# Patient Record
Sex: MALE | Race: WHITE | Employment: FULL TIME | URBAN - METROPOLITAN AREA
[De-identification: names, ages, dates, MRNs, and addresses within clinical notes are randomized per-mention and may not be internally consistent; named-entity substitution may affect disease eponyms.]

---

## 2017-01-25 ENCOUNTER — HOSPITAL ENCOUNTER (EMERGENCY)
Facility: CLINIC | Age: 32
Discharge: HOME | End: 2017-01-25
Attending: EMERGENCY MEDICINE | Admitting: EMERGENCY MEDICINE
Payer: COMMERCIAL

## 2017-01-25 VITALS
SYSTOLIC BLOOD PRESSURE: 135 MMHG | DIASTOLIC BLOOD PRESSURE: 80 MMHG | WEIGHT: 190 LBS | RESPIRATION RATE: 20 BRPM | BODY MASS INDEX: 27.2 KG/M2 | OXYGEN SATURATION: 98 % | TEMPERATURE: 98.2 F | HEIGHT: 70 IN

## 2017-01-25 DIAGNOSIS — M54.2 CERVICALGIA: ICD-10-CM

## 2017-01-25 DIAGNOSIS — M54.12 CERVICAL RADICULOPATHY: ICD-10-CM

## 2017-01-25 PROCEDURE — 25000132 ZZH RX MED GY IP 250 OP 250 PS 637: Performed by: EMERGENCY MEDICINE

## 2017-01-25 PROCEDURE — 25000131 ZZH RX MED GY IP 250 OP 636 PS 637: Performed by: EMERGENCY MEDICINE

## 2017-01-25 PROCEDURE — 99283 EMERGENCY DEPT VISIT LOW MDM: CPT

## 2017-01-25 RX ORDER — IBUPROFEN 600 MG/1
600 TABLET, FILM COATED ORAL ONCE
Status: COMPLETED | OUTPATIENT
Start: 2017-01-25 | End: 2017-01-25

## 2017-01-25 RX ORDER — METHYLPREDNISOLONE 4 MG
TABLET, DOSE PACK ORAL
Qty: 21 TABLET | Refills: 0 | Status: SHIPPED | OUTPATIENT
Start: 2017-01-25 | End: 2017-02-14

## 2017-01-25 RX ORDER — CYCLOBENZAPRINE HCL 10 MG
10 TABLET ORAL 3 TIMES DAILY PRN
Qty: 12 TABLET | Refills: 0 | Status: SHIPPED | OUTPATIENT
Start: 2017-01-25 | End: 2017-01-31

## 2017-01-25 RX ORDER — GINSENG 100 MG
CAPSULE ORAL
Status: DISCONTINUED
Start: 2017-01-25 | End: 2017-01-26 | Stop reason: HOSPADM

## 2017-01-25 RX ORDER — DEXAMETHASONE 4 MG/1
8 TABLET ORAL ONCE
Status: COMPLETED | OUTPATIENT
Start: 2017-01-25 | End: 2017-01-25

## 2017-01-25 RX ORDER — IBUPROFEN 800 MG/1
800 TABLET, FILM COATED ORAL EVERY 8 HOURS PRN
Qty: 24 TABLET | Refills: 0 | Status: SHIPPED | OUTPATIENT
Start: 2017-01-25 | End: 2017-02-02

## 2017-01-25 RX ADMIN — IBUPROFEN 600 MG: 600 TABLET ORAL at 20:05

## 2017-01-25 RX ADMIN — DEXAMETHASONE 8 MG: 4 TABLET ORAL at 23:00

## 2017-01-25 ASSESSMENT — ENCOUNTER SYMPTOMS
FEVER: 0
WEAKNESS: 0
HEADACHES: 1
NECK PAIN: 1
BACK PAIN: 1
NUMBNESS: 0

## 2017-01-25 NOTE — ED AVS SNAPSHOT
[facility] Emergency Department    [address]    [facility] [address]    Phone:  [phone]    Fax:  [phone]                                       Eladio Navarro   MRN: [id number]    Department:  [facility] Emergency Department   Date of Visit:  1/25/2017           After Visit Summary Signature Page     I have received my discharge instructions, and my questions have been answered. I have discussed any challenges I see with this plan with the nurse or doctor.    ..........................................................................................................................................  Patient/Patient Representative Signature      ..........................................................................................................................................  Patient Representative Print Name and Relationship to Patient    ..................................................               ................................................  Date                                            Time    ..........................................................................................................................................  Reviewed by Signature/Title    ...................................................              ..............................................  Date                                                            Time

## 2017-01-25 NOTE — ED AVS SNAPSHOT
[facility] Emergency Department    [address]    [facility] [address]    Phone:  [phone]    Fax:  [phone]                                       Eladio Navarro   MRN: [id number]    Department:  [facility] Emergency Department   Date of Visit:  1/25/2017           Patient Information     Date Of Birth          1985        Your diagnoses for this visit were:     Cervicalgia     Cervical radiculopathy        You were seen by Oniel Hensley MD.      Follow-up Information     Follow up with Spine clinic as discussed.        Discharge Instructions           Chronic Pain Resources  *Many of the listed clinics will need a physician referral and medical records sent prior to making an appointment. Insuranceproviders often need to be called for acceptance.  Clinic Location/Contact Information/Hours Information   [facility]  [address] [place] [address]  [phone] Sub acute and chronic pain - interventional pain medicine available   [facility] [place] Tw[address]  [address] [place] [address] [phone]  M-TH: 7:30 am - 5:00 pm F: 7:30 am - 4:00 pm Sub acute and chronic pain - interventional pain medicine available   [facility] Medical Pain Specialist   Dr. Benny Dc [address] [facility],  [address] [place] [address] [phone]  M-F: 8:00 am - 5:30 pm Acute and chronic pain management including medication management.   [facility]  Chronic Pain    Dr. Wayne Morales [address] [place] [address] [phone]  M-F: 7:00 am - 4:30 pm Inpatient residential chronic pain program and outpatient clinic, focus on rehabilitative pain management.   [facility]  Interventional Pain Clinic [address] [place] [address]  [address] [facility], [address] [place] [address] [phone]  M-F: 8:00 am - 4:00 pm Specializing in  interventional pain management including epidural injections and medication management.   [facility] [address] [place] [address]  [phone]  M-TH: 8:00 am - 9:00 pm  F: 8:00 am - 4:30 pm Integrative medicine services including massage therapy, reflexology, mind/body therapy, guided imagery, acupuncture, exercises physiology, healing , and integrative nutrition.   [facility]  [address] [facility], [address]  [place] [address]  *Other [facility] Locations Available  [phone]  M-F: Depends on clinic Chronic, cancer and spinal pain syndromes - focus on interventions, rehabilitation, alternative medicine and behavioral health. Offers an outpatient four week chronic pain program, four hours daily, Monday through Friday.   [facility]  [address], [address] [place] [address]  [phone]  M-F: 8:00 am - 5:00 pm   Specializing in neck and back pain, headache management, TMJ/Orofacial pain, including psychological and alternative therapies  New Outpatient Programs: Fibromyalgia, Chronic Daily Headache, Chronic Neck & Back Pain, Chronic Neck & Shoulder Pain in Dental Professionals             Discharge References/Attachments     RADICULOPATHY, CERVICAL (ENGLISH)    DEGENERATIVE DISK DISEASE (ENGLISH)      24 Hour Appointment Hotline       To make an appointment at any [facility] call [phone] ([phone]). If you don't have a family doctor or clinic, we will help you find one. [facility] are conveniently located to serve the needs of you and your family.             Review of your medicines      START taking        Dose / Directions Last dose taken    cyclobenzaprine 10 MG tablet   Commonly known as:  FLEXERIL   Dose:  10 mg   Quantity:  12 tablet        Take 1 tablet (10 mg) by mouth 3 times daily as needed for  muscle spasms   Refills:  0        methylPREDNISolone 4 MG tablet   Commonly known as:  MEDROL DOSEPAK   Quantity:  21 tablet        Follow package instructions   Refills:  0          CONTINUE these medicines which may have CHANGED, or have new prescriptions. If we are uncertain of the size of tablets/capsules you have at home, strength may be listed as something that might have changed.        Dose / Directions Last dose taken    ibuprofen 800 MG tablet   Commonly known as:  ADVIL/MOTRIN   Dose:  800 mg   What changed:    - medication strength  - how much to take  - when to take this   Quantity:  24 tablet        Take 1 tablet (800 mg) by mouth every 8 hours as needed for moderate pain   Refills:  0          Our records show that you are taking the medicines listed below. If these are incorrect, please call your family doctor or clinic.        Dose / Directions Last dose taken    ALEVE PO        Refills:  0        diclofenac 0.1 % ophthalmic solution   Commonly known as:  VOLTAREN   Quantity:  10 mL        Use one drop in both eyes four times a day for 2 days and then as directed by ophthamology   Refills:  0        * methocarbamol 750 MG tablet   Commonly known as:  ROBAXIN   Dose:  750 mg   Quantity:  30 tablet        Take 1 tablet (750 mg) by mouth 4 times daily as needed for muscle spasms   Refills:  0        * methocarbamol 750 MG tablet   Commonly known as:  ROBAXIN   Dose:  750 mg   Quantity:  30 tablet        Take 1 tablet (750 mg) by mouth 3 times daily as needed for muscle spasms   Refills:  0        OXYCODONE HCL PO        Refills:  0        * Notice:  This list has 2 medication(s) that are the same as other medications prescribed for you. Read the directions carefully, and ask your doctor or other care provider to review them with you.            Prescriptions were sent or printed at these locations (3 Prescriptions)                   Other Prescriptions                Printed at Department/Unit printer  (3 of 3)         methylPREDNISolone (MEDROL DOSEPAK) 4 MG tablet               ibuprofen (ADVIL/MOTRIN) 800 MG tablet               cyclobenzaprine (FLEXERIL) 10 MG tablet                Orders Needing Specimen Collection     None      Pending Results     No orders found from 1/24/2017 to 1/26/2017.            Pending Culture Results     No orders found from 1/24/2017 to 1/26/2017.             Test Results from your hospital stay            Clinical Quality Measure: Blood Pressure Screening     Your blood pressure was checked while you were in the emergency department today. The last reading we obtained was  BP: 115/89 mmHg . Please read the guidelines below about what these numbers mean and what you should do about them.  If your systolic blood pressure (the top number) is less than 120 and your diastolic blood pressure (the bottom number) is less than 80, then your blood pressure is normal. There is nothing more that you need to do about it.  If your systolic blood pressure (the top number) is 120-139 or your diastolic blood pressure (the bottom number) is 80-89, your blood pressure may be higher than it should be. You should have your blood pressure rechecked within a year by a primary care provider.  If your systolic blood pressure (the top number) is 140 or greater or your diastolic blood pressure (the bottom number) is 90 or greater, you may have high blood pressure. High blood pressure is treatable, but if left untreated over time it can put you at risk for heart attack, stroke, or kidney failure. You should have your blood pressure rechecked by a primary care provider within the next 4 weeks.  If your provider in the emergency department today gave you specific instructions to follow-up with your doctor or provider even sooner than that, you should follow that instruction and not wait for up to 4 weeks for your follow-up visit.        Thank you for choosing [facility]       Thank you for choosing [facility] for  "your care. Our goal is always to provide you with excellent care. Hearing back from our patients is one way we can continue to improve our services. Please take a few minutes to complete the written survey that you may receive in the mail after you visit with us. Thank you!        [email or web address] Information     [employer] Information lets you send messages to your doctor, view your test results, renew your prescriptions, schedule appointments and more. To sign up, go to [email or web address] . Click on \"Log in\" on the left side of the screen, which will take you to the Welcome page. Then click on \"Sign up Now\" on the right side of the page.     You will be asked to enter the access code listed below, as well as some personal information. Please follow the directions to create your username and password.     Your access code is: [email or web address]  Expires: 3/9/2017 11:40 PM     Your access code will  in 90 days. If you need help or a new code, please call your [facility] clinic or [phone].        Care EveryWhere ID     This is your Care EveryWhere ID. This could be used by other organizations to access your [facility] medical records  [id number]        After Visit Summary       This is your record. Keep this with you and show to your community pharmacist(s) and doctor(s) at your next visit.                  "
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
## 2017-01-26 NOTE — DISCHARGE INSTRUCTIONS
Chronic Pain Resources  *Many of the listed clinics will need a physician referral and medical records sent prior to making an appointment. Insuranceproviders often need to be called for acceptance.  Clinic Location/Contact Information/Hours Information   [facility]  [address] [place] [address]  [phone] Sub acute and chronic pain - interventional pain medicine available   [facility] [place] Twelfth [address]  [address] [place], [place] [id number] [phone]  M-TH: 7:30 am - 5:00 pm F: 7:30 am - 4:00 pm Sub acute and chronic pain - interventional pain medicine available   [facility] Medical Pain Specialist   Dr. Benny Dc [address] [facility],  [address] [place], [place] [address] [phone]  M-F: 8:00 am - 5:30 pm Acute and chronic pain management including medication management.   [facility]  Chronic Pain    Dr. Wayne Morales [address] [place], [place] [address] [phone]  M-F: 7:00 am - 4:30 pm Inpatient residential chronic pain program and outpatient clinic, focus on rehabilitative pain management.   [facility]  Interventional Pain Clinic [address] [place] [address], [address] [place], [place] [id number] [phone]  M-F: 8:00 am - 4:00 pm Specializing in interventional pain management including epidural injections and medication management.   [facility] [address] [place], [place] [id number] [phone]  M-TH: 8:00 am - 9:00 pm  F: 8:00 am - 4:30 pm Integrative medicine services including massage therapy, reflexology, mind/body therapy, guided imagery, acupuncture, exercises physiology, healing , and integrative nutrition.   [facility]  [address] [facility], [address]  [place] [address]  *Other [facility] Locations Available  [phone]  M-F: Depends on clinic  Chronic, cancer and spinal pain syndromes - focus on interventions, rehabilitation, alternative medicine and behavioral health. Offers an outpatient four week chronic pain program, four hours daily, Monday through Friday.   [facility] [address], [address] [place] [address]  [phone]  M-F: 8:00 am - 5:00 pm   Specializing in neck and back pain, headache management, TMJ/Orofacial pain, including psychological and alternative therapies  New Outpatient Programs: Fibromyalgia, Chronic Daily Headache, Chronic Neck & Back Pain, Chronic Neck & Shoulder Pain in Dental Professionals

## 2017-01-26 NOTE — ED NOTES
Pt resting in bed; given medication as ordered; reviewed discharge instructions with pt; pt now states he has wound to head from fall; asks nurse for wound care; minor abrasion noted to posterior hairline and top of head; wound care completed with bacitracin; pt instructed on wound care also; denies any other needs

## 2017-01-26 NOTE — ED NOTES
Nurse went to dc pt; pt states he is concerned about his exam; states he does not feel he got a thorough exam and felt dismissed by the doctor; states he is unwilling to go back home and wants to see someone else; nurse explained to pt that the referral to the spine specialist is the best place to treat a spinal issue; pt continues to state he does not want d/c; states pain is worse; charge nurse R C RN asked to check in with pt.

## 2017-02-14 ENCOUNTER — APPOINTMENT (OUTPATIENT)
Dept: GENERAL RADIOLOGY | Facility: CLINIC | Age: 32
End: 2017-02-14
Attending: EMERGENCY MEDICINE
Payer: COMMERCIAL

## 2017-02-14 ENCOUNTER — APPOINTMENT (OUTPATIENT)
Dept: CT IMAGING | Facility: CLINIC | Age: 32
End: 2017-02-14
Attending: EMERGENCY MEDICINE
Payer: COMMERCIAL

## 2017-02-14 ENCOUNTER — HOSPITAL ENCOUNTER (EMERGENCY)
Facility: CLINIC | Age: 32
Discharge: HOME | End: 2017-02-14
Attending: EMERGENCY MEDICINE | Admitting: EMERGENCY MEDICINE
Payer: COMMERCIAL

## 2017-02-14 VITALS
BODY MASS INDEX: 27.2 KG/M2 | DIASTOLIC BLOOD PRESSURE: 68 MMHG | RESPIRATION RATE: 16 BRPM | OXYGEN SATURATION: 96 % | SYSTOLIC BLOOD PRESSURE: 118 MMHG | HEART RATE: 82 BPM | TEMPERATURE: 98.3 F | WEIGHT: 190 LBS | HEIGHT: 70 IN

## 2017-02-14 DIAGNOSIS — S70.02XA CONTUSION OF LEFT HIP, INITIAL ENCOUNTER: ICD-10-CM

## 2017-02-14 DIAGNOSIS — S46.912A LEFT SHOULDER STRAIN, INITIAL ENCOUNTER: ICD-10-CM

## 2017-02-14 DIAGNOSIS — S00.01XA SCALP ABRASION, INITIAL ENCOUNTER: ICD-10-CM

## 2017-02-14 DIAGNOSIS — S16.1XXA CERVICAL STRAIN, ACUTE, INITIAL ENCOUNTER: ICD-10-CM

## 2017-02-14 DIAGNOSIS — S39.012A BACK STRAIN, INITIAL ENCOUNTER: ICD-10-CM

## 2017-02-14 DIAGNOSIS — S09.90XA CLOSED HEAD INJURY, INITIAL ENCOUNTER: ICD-10-CM

## 2017-02-14 DIAGNOSIS — S60.811A ABRASION OF RIGHT WRIST, INITIAL ENCOUNTER: ICD-10-CM

## 2017-02-14 PROCEDURE — 73030 X-RAY EXAM OF SHOULDER: CPT | Mod: LT

## 2017-02-14 PROCEDURE — 72100 X-RAY EXAM L-S SPINE 2/3 VWS: CPT

## 2017-02-14 PROCEDURE — 25000132 ZZH RX MED GY IP 250 OP 250 PS 637: Performed by: EMERGENCY MEDICINE

## 2017-02-14 PROCEDURE — 25000125 ZZHC RX 250: Performed by: EMERGENCY MEDICINE

## 2017-02-14 PROCEDURE — 72072 X-RAY EXAM THORAC SPINE 3VWS: CPT

## 2017-02-14 PROCEDURE — 73502 X-RAY EXAM HIP UNI 2-3 VIEWS: CPT

## 2017-02-14 PROCEDURE — 70450 CT HEAD/BRAIN W/O DYE: CPT

## 2017-02-14 PROCEDURE — 90715 TDAP VACCINE 7 YRS/> IM: CPT | Performed by: EMERGENCY MEDICINE

## 2017-02-14 PROCEDURE — 72125 CT NECK SPINE W/O DYE: CPT

## 2017-02-14 PROCEDURE — 99285 EMERGENCY DEPT VISIT HI MDM: CPT | Mod: 25

## 2017-02-14 PROCEDURE — 90471 IMMUNIZATION ADMIN: CPT

## 2017-02-14 RX ORDER — CYCLOBENZAPRINE HCL 10 MG
10 TABLET ORAL ONCE
Status: COMPLETED | OUTPATIENT
Start: 2017-02-14 | End: 2017-02-14

## 2017-02-14 RX ORDER — GINSENG 100 MG
CAPSULE ORAL
Status: DISCONTINUED
Start: 2017-02-14 | End: 2017-02-15 | Stop reason: HOSPADM

## 2017-02-14 RX ORDER — TRAMADOL HYDROCHLORIDE 50 MG/1
100 TABLET ORAL ONCE
Status: COMPLETED | OUTPATIENT
Start: 2017-02-14 | End: 2017-02-14

## 2017-02-14 RX ORDER — CYCLOBENZAPRINE HCL 10 MG
10 TABLET ORAL 3 TIMES DAILY PRN
Qty: 20 TABLET | Refills: 0 | Status: SHIPPED | OUTPATIENT
Start: 2017-02-14 | End: 2017-02-20

## 2017-02-14 RX ORDER — ACETAMINOPHEN 500 MG
1000 TABLET ORAL ONCE
Status: COMPLETED | OUTPATIENT
Start: 2017-02-14 | End: 2017-02-14

## 2017-02-14 RX ORDER — LIDOCAINE 50 MG/G
1 PATCH TOPICAL EVERY 24 HOURS
Qty: 10 PATCH | Refills: 0 | Status: SHIPPED | OUTPATIENT
Start: 2017-02-14 | End: 2017-02-24

## 2017-02-14 RX ORDER — TRAMADOL HYDROCHLORIDE 50 MG/1
50-100 TABLET ORAL EVERY 6 HOURS PRN
Qty: 15 TABLET | Refills: 0 | Status: SHIPPED | OUTPATIENT
Start: 2017-02-14 | End: 2017-04-26

## 2017-02-14 RX ADMIN — ACETAMINOPHEN 1000 MG: 500 TABLET, FILM COATED ORAL at 20:25

## 2017-02-14 RX ADMIN — CYCLOBENZAPRINE HYDROCHLORIDE 10 MG: 10 TABLET, FILM COATED ORAL at 21:40

## 2017-02-14 RX ADMIN — CLOSTRIDIUM TETANI TOXOID ANTIGEN (FORMALDEHYDE INACTIVATED), CORYNEBACTERIUM DIPHTHERIAE TOXOID ANTIGEN (FORMALDEHYDE INACTIVATED), BORDETELLA PERTUSSIS TOXOID ANTIGEN (GLUTARALDEHYDE INACTIVATED), BORDETELLA PERTUSSIS FILAMENTOUS HEMAGGLUTININ ANTIGEN (FORMALDEHYDE INACTIVATED), BORDETELLA PERTUSSIS PERTACTIN ANTIGEN, AND BORDETELLA PERTUSSIS FIMBRIAE 2/3 ANTIGEN 0.5 ML: 5; 2; 2.5; 5; 3; 5 INJECTION, SUSPENSION INTRAMUSCULAR at 20:26

## 2017-02-14 RX ADMIN — TRAMADOL HYDROCHLORIDE 100 MG: 50 TABLET, COATED ORAL at 21:40

## 2017-02-14 NOTE — ED AVS SNAPSHOT
[facility] Emergency Department    [address]    [facility] [address]    Phone:  [phone]    Fax:  [phone]                                       Eladio Navarro   MRN: [id number]    Department:  [facility] Emergency Department   Date of Visit:  2/14/2017           After Visit Summary Signature Page     I have received my discharge instructions, and my questions have been answered. I have discussed any challenges I see with this plan with the nurse or doctor.    ..........................................................................................................................................  Patient/Patient Representative Signature      ..........................................................................................................................................  Patient Representative Print Name and Relationship to Patient    ..................................................               ................................................  Date                                            Time    ..........................................................................................................................................  Reviewed by Signature/Title    ...................................................              ..............................................  Date                                                            Time

## 2017-02-14 NOTE — ED AVS SNAPSHOT
[facility] Emergency Department    [address]    [facility] [address]    Phone:  [phone]    Fax:  [phone]                                       Eladio Navarro   MRN: [id number]    Department:  [facility] Emergency Department   Date of Visit:  2/14/2017           Patient Information     Date Of Birth          1985        Your diagnoses for this visit were:     Closed head injury, initial encounter     Scalp abrasion, initial encounter     Cervical strain, acute, initial encounter     Back strain, initial encounter     Contusion of left hip, initial encounter     Left shoulder strain, initial encounter     Abrasion of right wrist, initial encounter        You were seen by Ivis Bronson MD.      Follow-up Information     Follow up with Primary care.    Why:  1 week as needed        Follow up with [facility] Emergency Department.    Specialty:  EMERGENCY MEDICINE    Why:  As needed, If symptoms worsen    Contact information:    [address]  [facility] [address]  [phone]        Discharge Instructions       Discharge Instructions  Head Injury    You have been seen today for a head injury. You were checked for serious problems, like bleeding on the brain, but these problems cannot always be found right away.  Due to this risk, you should not be alone for 24 hours after your injury.  Follow up with your regular physician in 5-7 days. If you are taking a blood thinner, such as aspirin, Pradaxa  (dabigatran), Coumadin  (warfarin), or Plavix  (clopidogrel), you are at especially high risk for immediate or delayed bleeding, and need to re-check with a physician in 24 hours, or sooner if any of the symptoms below happen.     Return to the Emergency Department if:    You are confused, have amnesia, or you are not acting right.    Your headache gets worse or you start to have a really bad headache even with your  recommended treatment plan.    You vomit more than once.    You have a convulsion or seizure.    You have trouble walking.    You have weakness or paralysis in an arm or a leg.    You have blood or fluid coming from your ears or nose.    You have new symptoms or anything that worries you.    Sleeping:  It is okay for you to sleep, but someone should wake you up as instructed by your doctor, and someone should check on you at your usual time to wake up.     Activity:    Do not drive for at least 24 hours.    Do not drive if you have dizzy spells or trouble concentrating, or remembering things.    Do not return to any contact sports until cleared by your regular doctor.     Follow-up:  It is very important that you make an appointment with your clinic and go to the appointment.  If you do not follow-up with your regular doctor, it may result in missing an important development which could result in permanent injury or disability and/or lasting pain.  If there is any problem keeping your appointment, call your doctor or return to the Emergency Department.    MORE INFORMATION:    Concussion:  A concussion is a minor head injury that may cause temporary problems with the way your brain works.  Some symptoms include:  confusion, amnesia, nausea and vomiting, dizziness, fatigue, memory or concentration problems, irritability and sleep problems.    CT Scans: Your evaluation today may have included a CT scan (CAT scan) to look for things like bleeding or a skull fracture (break).  CT scans involve radiation and too many CT scans can cause serious health problems like cancer, especially in children.  Because of this, your doctor may not have ordered a CT scan today if they think you are at low risk for a serious or life threatening problem.    If you were given a prescription for medicine here today, be sure to read all of the information (including the package insert) that comes with your prescription.  This will include  important information about the medicine, its side effects, and any warnings that you need to know about.  The pharmacist who fills the prescription can provide more information and answer questions you may have about the medicine.  If you have questions or concerns that the pharmacist cannot address, please call or return to the Emergency Department.     Opioid Medication Information    Pain medications are among the most commonly prescribed medicines, so we are including this information for all our patients. If you did not receive pain medication or get a prescription for pain medicine, you can ignore it.     You may have been given a prescription for an opioid (narcotic) pain medicine and/or have received a pain medicine while here in the Emergency Department. These medicines can make you drowsy or impaired. You must not drive, operate dangerous equipment, or engage in any other dangerous activities while taking these medications. If you drive while taking these medications, you could be arrested for DUI, or driving under the influence. Do not drink any alcohol while you are taking these medications.     Opioid pain medications can cause addiction. If you have a history of chemical dependency of any type, you are at a higher risk of becoming addicted to pain medications.  Only take these prescribed medications to treat your pain when all other options have been tried. Take it for as short a time and as few doses as possible. Store your pain pills in a secure place, as they are frequently stolen and provide a dangerous opportunity for children or visitors in your house to start abusing these powerful medications. We will not replace any lost or stolen medicine.  As soon as your pain is better, you should flush all your remaining medication.     Many prescription pain medications contain Tylenol  (acetaminophen), including Vicodin , Tylenol #3 , Norco , Lortab , and Percocet .  You should not take any extra pills  of Tylenol  if you are using these prescription medications or you can get very sick.  Do not ever take more than 3000 mg of acetaminophen in any 24 hour period.    All opioids tend to cause constipation. Drink plenty of water and eat foods that have a lot of fiber, such as fruits, vegetables, prune juice, apple juice and high fiber cereal.  Take a laxative if you don t move your bowels at least every other day. Miralax , Milk of Magnesia, Colace , or Senna  can be used to keep you regular.      Remember that you can always come back to the Emergency Department if you are not able to see your regular doctor in the amount of time listed above, if you get any new symptoms, or if there is anything that worries you.        Discharge Instructions  Neck Strain    You have been seen today for a neck sprain or strain.  Neck strains usually result from an injury to the neck. Car accidents, contact sports and falls are common causes of neck strain. Sometimes your neck can start to hurt because of increased activity, muscle tension, an abnormal sleeping position, or because of other problems like arthritis in the neck.     Neck pain usually comes from injured muscles and ligaments. Sometimes there is a herniated ( slipped ) disc. We don t usually do MRI scans to look for these right away, since most herniated discs will get better on their own with time. Today, we did not find any evidence that your neck pain was caused by a serious condition, such as an infection, fracture, or tumor. However, sometimes symptoms develop over time and cannot be found during an emergency visit, so it is very important that you follow up with your primary doctor.    Return to the Emergency Department if:    You have increasing pain in your neck.    You develop difficulty swallowing or breathing.    You have numbness, weakness, or trouble moving your arms or legs.    You have severe dizziness and difficulty walking.    You are unable to control  your bladder or bowels.    You develop severe headache or ringing in the ears.    Call your doctor if:     Your neck pain is not controlled with the medicine we gave you.    You are not back to normal within 1 week.    What can I do to help myself at home?    If you had an injury, use cold for the first 1-2 days. Cold helps relieve pain and reduce inflammation.  Apply ice packs to the neck or areas of pain every 1-2 hours for 20 minutes at a time. Place a towel or cloth between your skin and the ice pack.    After the first 2 days, using heat can help with neck pain and stiffness. You may use a warm shower or bath, warm towels on the neck, or a heating pad. Do not sleep with a heating pad, as you can be burned.     Pain medications - You may take a pain medication such as Tylenol  (acetaminophen), Advil , Nuprin  (ibuprofen) or Aleve  (naproxen).  If you have been given a narcotic such as Vicodin  (hydrocodone with acetaminophen), Percocet  (oxycodone with acetaminophen), codeine, or a muscle relaxant such as Flexeril  (cyclobenzaprine) or Soma  (carisoprodol), do not drive for four hours after you have taken it. If the narcotic contains Tylenol  (acetaminophen), do not take Tylenol  with it. All narcotics will cause constipation, so eat a high fiber diet.      It is usually best to rest the neck for 1-2 days after an injury, then start gentle stretching exercises.     It is helpful to place a small pillow under the nape of your neck to provide proper neutral positioning.     You should stay active and do your usual work as much as you can, unless this involves heavy physical labor. Ask your doctor if you need work restrictions.  If you were given a prescription for medicine here today, be sure to read all of the information (including the package insert) that comes with your prescription.  This will include important information about the medicine, its side effects, and any warnings that you need to know about.   The pharmacist who fills the prescription can provide more information and answer questions you may have about the medicine.  If you have questions or concerns that the pharmacist cannot address, please call or return to the Emergency Department.   Remember that you can always come back to the Emergency Department if you are not able to see your regular doctor in the amount of time listed above, if you get any new symptoms, or if there is anything that worries you.      Abrasions  Abrasions are skin scrapes. Their treatment depends on how large and deep the abrasion is.  Home care   You may be prescribed an antibiotic cream or ointment to apply to the wound. This helps prevent infection. Follow instructions when using this medication.  General care    To care for the abrasion, do the following each day for as long as directed by your health care provider.    If you were given a bandage, change it once a day. If your bandage sticks to the wound, soak it in warm water until it loosens.    Wash the area with soap and warm water. You may do this in a sink or under a tub faucet or shower. Rinse off the soap. Then pat the area dry with a clean towel.    If antibiotic ointment or cream was prescribed, reapply it to the wound as directed. Cover the wound with a fresh non-stick bandage. If the bandage becomes wet or dirty, change it as soon as possible.    You may use acetaminophen or ibuprofen to control pain unless another pain medication was prescribed. Note: If you have chronic liver or kidney disease or ever had a stomach ulcer or GI bleeding, talk with your health care provider before using these medications. Do not use ibuprofen in children under six months of age.    Most skin wounds heal within ten days. But an infection may occur despite treatment. Therefore, monitor the wound for signs of infection as listed below.  Follow-up care  Follow up with your health care provider, or as advised.  When to seek medical  advice  Call your health care provider right away if any of these occur:    Fever of 101 F (38.3 C) or higher, or as directed by your health care provider    Increasing pain, redness, swelling, or drainage from the wound    Bleeding from the wound that does not stop after a few minutes of steady, firm pressure    Decreased ability to move any body part near wound    4006-0494 [employer]. [address] [place] [address]. All rights reserved. This information is not intended as a substitute for professional medical care. Always follow your healthcare professional's instructions.          Back Sprain or Strain    Injury to the muscles (strain) or ligaments (sprain) around the spine can be troubling. Injury may occur after a sudden forceful twisting or bending force such as in a car accident, after a simple awkward movement, or after lifting something heavy with poor body positioning. In any case, muscle spasm is often present and adds to the pain.  Thankfully, most people feel better in 1 to 2 weeks, and most of the rest in 1 to 2 months. Most people can remain active. Unless you had a forceful physical injury such as a car accident or fall, X-rays are usually not ordered for the first evaluation of a back sprain or strain. If pain continues and does not respond to medical treatment, your healthcare provider may order X-rays and other tests.  Home care  The following guidelines will help you care for your injury at home:    When in bed, try to find a comfortable position. A firm mattress is best. Try lying flat on your back with pillows under your knees. You can also try lying on your side with your knees bent up toward your chest and a pillow between your knees.    Don't sit for long periods. Try not to take long car rides or take other trips that have you sitting for a long time. This puts more stress on the lower back than standing or walking.    During the first 24 to 72 hours after an  injury or flare-up, apply an ice pack to the painful area for 20 minutes. Then remove it for 20 minutes. Do this for 60 to 90 minutes, or several times a day, This will reduce swelling and pain. Be sure to wrap the ice pack in a thin towel or plastic to protect your skin.    You can start with ice, then switch to heat. Heat from a hot shower, hot bath, or heating pad reduces swelling and pain and works well for muscle spasms. Put heat on the painful area for 20 minutes, then remove for 20 minutes. Do this for 60 to 90 minutes, or several times a day. Do not use a heating pad while sleeping. It can burn the skin.    You can alternate the ice and heat. Talk with your healthcare provider to find out the best treatment or therapy for your back pain.    Therapeutic massage will help relax the back muscles without stretching them.    Be aware of safe lifting methods. Do not lift anything over 15 pounds until all of the pain is gone.  Medicines  Talk to your healthcare provider before using medicines, especially if you have other health problems or are taking other medicines.    You may use acetaminophen or ibuprofen to control pain, unless another pain medicine was prescribed. If you have chronic conditions like diabetes, liver or kidney disease, stomach ulcers, or gastrointestinal bleeding, or are taking blood-thinner medicines, talk with your doctor before taking any medicines.    Be careful if you are given prescription medicines, narcotics, or medicine for muscle spasm. They can cause drowsiness, and affect your coordination, reflexes, and judgment. Do not drive or operate heavy machinery.  Follow-up care  Follow up with your healthcare provider or this facility as advised. You may need physical therapy or more tests if your symptoms get worse.  If you had X-rays today, they didn t show any broken bones, breaks, or fractures. Sometimes fractures don t show up on the first X-ray. Bruises and sprains can sometimes hurt  as much as a fracture. These injuries can take time to heal completely. If your symptoms don t improve or they get worse, talk with your healthcare provider. You may need a repeat X-ray.  Call 911  Call for emergency care if any of the following occur:    Trouble breathing    Confused    Very drowsy or trouble awakening    Fainting or loss of consciousness    Rapid or very slow heart rate    Loss of bowel or bladder control  When to seek medical advice  Call your healthcare provider right away if any of the following occur:    Pain gets worse or spreads to your arms or legs    Weakness or numbness in one or both arms or legs    Numbness in the groin or genital area    2037-3323 [employer]. [address] [place] [address]. All rights reserved. This information is not intended as a substitute for professional medical care. Always follow your healthcare professional's instructions.          Hip Contusion       A contusion is another word for a bruise. It happens when small blood vessels break open and leak blood into the nearby area. A hip contusion can result from a bump, hit, or fall. Symptoms of a contusion often include changes in skin color (bruising), swelling, and pain. It may take several hours for a deep bruise to show up. If the injury is severe, you may need an x-ray to check for broken bones.  Swelling should decrease in a few days. Bruising and pain may take several weeks to go away.  Home care    Unless another medicine was prescribed, you may take acetaminophen, ibuprofen, or naproxen to help relieve pain and swelling. If needed, stronger pain medicines may be prescribed. Take all medicines exactly as directed.    Ice the bruised area to help reduce pain and swelling. Wrap a cold source (ice pack or ice cubes in a plastic bag) in a thin towel. Apply the cold source to the bruised area for 20 minutes every 1 to 2 hours the first day. Continue this 3 to 4 times a day until the pain  and swelling goes away.    If walking causes pain, use crutches or a walker until you can walk without pain. These items can be rented at most pharmacies and orthopedic supply stores.    If your injury is keeping you from moving around or caring for yourself properly, you may qualify for services such as home health care. Check with your insurance company to see if this type of care is covered.  Follow-up  Follow up with your healthcare provider, or as advised.  When to seek medical advice   Call your healthcare provider right away if any of these occur:    Increased pain, bruising, or swelling near the injured area    Decreased ability to bear weight on the injured side    Pain or swelling develops below the knee    Chest pain or shortness of breath    6894-5264 [employer]. [address] [place] [address], [place] [address]. All rights reserved. This information is not intended as a substitute for professional medical care. Always follow your healthcare professional's instructions.          Muscle Strain in the Extremities  A muscle strain is a stretching and tearing of muscle fibers. This causes pain, especially when you move that muscle. There may also be some swelling and bruising.  Home care    Keep the hurt area raised to reduce pain and swelling. This is especially important during the first 48 hours.    Apply an ice pack over the injured area for 15 to 20 minutes every 3 to 6 hours. You should do this for the first 24 to 48 hours. You can make an ice pack by filling a plastic bag that seals at the top with ice cubes and then wrapping it with a thin towel. Be careful not to injure your skin with the ice treatments. Ice should never be applied directly to skin. Continue the use of ice packs for relief of pain and swelling as needed. After 48 hours, apply heat (warm shower or warm bath) for 15 to 20 minutes several times a day, or alternate ice and heat.    You may use over-the-counter pain medicine to  control pain, unless another medicine was prescribed. If you have chronic liver or kidney disease or ever had a stomach ulcer or GI bleeding, talk with your healthcare provider before using these medicines.    For leg strains: If crutches have been recommended, don t put full weight on the hurt leg until you can do so without pain. You can return to sports when you are able to hop and run on the injured leg without pain.  Follow-up care  Follow up with your healthcare provider, or as advised.  When to seek medical advice  Call your healthcare provider right away if any of these occur:    The toes of the injured leg become swollen, cold, blue, numb, or tingly    Pain or swelling increases    6924-7790 [employer]. [address] [place] [address], [place] [id number]. All rights reserved. This information is not intended as a substitute for professional medical care. Always follow your healthcare professional's instructions.          24 Hour Appointment Hotline       To make an appointment at any [facility], call [phone] ([phone]). If you don't have a family doctor or clinic, we will help you find one. [facility] are conveniently located to serve the needs of you and your family.             Review of your medicines      START taking        Dose / Directions Last dose taken    cyclobenzaprine 10 MG tablet   Commonly known as:  FLEXERIL   Dose:  10 mg   Quantity:  20 tablet        Take 1 tablet (10 mg) by mouth 3 times daily as needed for muscle spasms   Refills:  0        lidocaine 5 % Patch   Commonly known as:  LIDODERM   Dose:  1 patch   Quantity:  10 patch        Place 1 patch onto the skin every 24 hours for 10 days   Refills:  0        traMADol 50 MG tablet   Commonly known as:  ULTRAM   Dose:   mg   Quantity:  15 tablet        Take 1-2 tablets ( mg) by mouth every 6 hours as needed for pain   Refills:  0                Prescriptions were sent or printed at these locations  (3 Prescriptions)                   Other Prescriptions                Printed at Department/Unit printer (3 of 3)         traMADol (ULTRAM) 50 MG tablet               lidocaine (LIDODERM) 5 % Patch               cyclobenzaprine (FLEXERIL) 10 MG tablet                Procedures and tests performed during your visit     Cervical spine CT w/o contrast    Head CT w/o contrast    Lumbar spine XR, 2-3 views    Shoulder XR, G/E 3 views, left    Thoracic spine XR, 3 views    XR Pelvis and Hip Left 2 Views      Orders Needing Specimen Collection     None      Pending Results     No orders found from 2/12/2017 to 2/15/2017.            Pending Culture Results     No orders found from 2/12/2017 to 2/15/2017.             Test Results from your hospital stay     2/14/2017  9:06 PM - Interface, Radiant Ib      Narrative     CT SCAN OF THE HEAD WITHOUT CONTRAST   2/14/2017 8:53 PM     HISTORY: Trauma. Head injury.     TECHNIQUE: Axial images of the head and coronal reformations without  IV contrast material.  Radiation dose for this scan was reduced using  automated exposure control, adjustment of the mA and/or kV according  to patient size, or iterative reconstruction technique.    COMPARISON: None.    FINDINGS: The ventricles are normal in size, shape and configuration.   The brain parenchyma and subarachnoid spaces are normal. There is no  evidence of intracranial hemorrhage, mass, acute infarct or anomaly.     The visualized portions of the sinuses and mastoids appear normal.  There is no evidence of trauma. Incidental note is made of a prominent  retrocerebellar CSF space without mass effect which is most likely an  anatomic variant or an incidental arachnoid cyst.        Impression     IMPRESSION: Negative head CT without contrast.    SEAMUS VALENTE MD         2/14/2017  9:06 PM - Interface, Radiant Ib      Narrative     CT CERVICAL SPINE WITHOUT CONTRAST   2/14/2017 8:53 PM     HISTORY: Trauma, neck pain     TECHNIQUE: Axial  images of the cervical spine were obtained without  intravenous contrast. Multiplanar reformations were performed.  Radiation dose for this scan was reduced using automated exposure  control, adjustment of the mA and/or kV according to patient size, or  iterative reconstruction technique.     COMPARISON: None.    FINDINGS: There is no evidence of fracture.    Alignment: Normal.      Craniocervical junction: Normal.     C1-C2: Normal.     C2-C3: Normal disc, facet joints, spinal canal and neural foramina.     C3-C4: Normal disc, facet joints, spinal canal and neural foramina.     C4-C5: There is a small central disc protrusion causing some mild  central canal stenosis.     C5-C6: Minimal posterior osteophyte formation. No stenosis.    C6-C7: Normal disc, facet joints, spinal canal and neural foramina.      C7-T1: Normal disc, facet joints, spinal canal and neural foramina.        Impression     IMPRESSION:  1. No evidence for fracture or any posterior malalignment.  2. Small central disc protrusion at C4-C5 with secondary mild central  canal stenosis.  3. Minimal C5-C6 degenerative disc disease without significant  stenosis.    SEAMUS VALENTE MD         2/14/2017  9:53 PM - Interface, [facility]      Narrative     THORACIC SPINE THREE VIEWS   2/14/2017 9:03 PM     HISTORY: Trauma. Back pain.    COMPARISON: None.    FINDINGS:  No acute fractures or dislocations. Alignment is anatomic.  Soft tissues are normal.         Impression     IMPRESSION: No fractures or dislocations.    JENNA CARDONA MD         2/14/2017  9:12 PM - Interface, [facility]      Narrative     LUMBAR SPINE TWO TO THREE VIEWS   2/14/2017 9:05 PM     HISTORY: Trauma. Back pain.    COMPARISON: None.        Impression     IMPRESSION: Normal.    SEAMUS VALENTE MD         2/14/2017  9:53 PM - Interface, [facility]      Narrative     SHOULDER LEFT THREE OR MORE VIEWS   2/14/2017 9:02 PM     HISTORY: Trauma, shoulder pain    COMPARISON: None.    FINDINGS: No  acute fractures or dislocations. Alignment is anatomic.  Soft tissues are normal.         Impression     IMPRESSION: No fractures or dislocations.    JENNA CARDONA MD         2/14/2017  9:12 PM - Interface, [facility]      Narrative     PELVIS AND LEFT HIP TWO VIEWS   2/14/2017 9:04 PM     HISTORY: Trauma. Left hip pain.    COMPARISON: 11/24/2015        Impression     IMPRESSION: No change. Negative for fracture or dislocation.    SEAMUS VALENTE MD                Clinical Quality Measure: Blood Pressure Screening     Your blood pressure was checked while you were in the emergency department today. The last reading we obtained was  BP: 137/78 . Please read the guidelines below about what these numbers mean and what you should do about them.  If your systolic blood pressure (the top number) is less than 120 and your diastolic blood pressure (the bottom number) is less than 80, then your blood pressure is normal. There is nothing more that you need to do about it.  If your systolic blood pressure (the top number) is 120-139 or your diastolic blood pressure (the bottom number) is 80-89, your blood pressure may be higher than it should be. You should have your blood pressure rechecked within a year by a primary care provider.  If your systolic blood pressure (the top number) is 140 or greater or your diastolic blood pressure (the bottom number) is 90 or greater, you may have high blood pressure. High blood pressure is treatable, but if left untreated over time it can put you at risk for heart attack, stroke, or kidney failure. You should have your blood pressure rechecked by a primary care provider within the next 4 weeks.  If your provider in the emergency department today gave you specific instructions to follow-up with your doctor or provider even sooner than that, you should follow that instruction and not wait for up to 4 weeks for your follow-up visit.        Thank you for choosing [facility]       Thank you for choosing  "[facility] for your care. Our goal is always to provide you with excellent care. Hearing back from our patients is one way we can continue to improve our services. Please take a few minutes to complete the written survey that you may receive in the mail after you visit with us. Thank you!        [facility] Information     [facility] lets you send messages to your doctor, view your test results, renew your prescriptions, schedule appointments and more. To sign up, go to [email or web address] . Click on \"Log in\" on the left side of the screen, which will take you to the Welcome page. Then click on \"Sign up Now\" on the right side of the page.     You will be asked to enter the access code listed below, as well as some personal information. Please follow the directions to create your username and password.     Your access code is: [email or web address]  Expires: 3/9/2017 11:40 PM     Your access code will  in 90 days. If you need help or a new code, please call your [facility] clinic or [phone].        Care EveryWhere ID     This is your Care EveryWhere ID. This could be used by other organizations to access your [facility] medical records  [id number]        After Visit Summary       This is your record. Keep this with you and show to your community pharmacist(s) and doctor(s) at your next visit.                  "

## 2017-02-15 NOTE — ED PROVIDER NOTES
"CHIEF COMPLAINT:  Fall, multiple injuries.      HISTORY OF PRESENT ILLNESS:  Eladio Navarro is a 31-year-old male who presents to the emergency room with chronic neck pain and left shoulder pain who presents to the emergency room with concerns for a fall on slippery driveway at his parents' home.  He notes he slipped on ice and landed on a curb and driveway where he hit the back of his head approximately 2 hours prior to arrival; he is unsure if he lost consciousness, but thinks he may have.  He notes he is having an \"excruciating headache.\"  He notes he is having neck pain which seems worse than his baseline pain, increased left shoulder pain as well as back pain and left hip pain.  Aside from the numbness in his right foot, which is chronic, he denies any numbness or tingling.  He denies any weakness.  He was able to ambulate afterward and got up with minimal assistance.  He is not sure if his tetanus is up-to-date.  He denies any dizziness, vomiting, nausea, vision changes, bowel or urinary incontinence or any other new symptoms or concerns.      MEDICATIONS:  No chronic medications.      ALLERGIES:  No known allergies.      PAST MEDICAL HISTORY:  Chronic neck pain, chronic left shoulder pain.      SOCIAL HISTORY:  The patient does not smoke.  Denies recreational drug use or regular narcotic use.  He denies regular alcohol use.      FAMILY HISTORY:  Noncontributory.      REVIEW OF SYSTEMS:  As noted in history present illness.  All other systems are reviewed and negative.      PHYSICAL EXAMINATION:   VITAL SIGNS:  Blood pressure is 150/97, temperature is 98.3 Fahrenheit oral, pulse is 82, respiratory rate is 16, and oxygen saturation 99% on room air.   IN GENERAL:  Reveals an adult male sitting upright.   EYES:  Pupils are equally round, react to light.  Conjunctivae are normal.   HENT:  Scalp is nontender to palpation.  There are superficial abrasions over the posterior head.  No active bleeding.  No palpable " hematoma or skull deformity.  TMs are normal in appearance with no hemotympanum.  Oral mucosa is moist.  No intraoral edema.  No facial tenderness to palpation.   NECK:  Tender in the mid and lower cervical spine, midline and into the left paraspinal musculature.  No palpable bony deformity or crepitus.  The patient is maintained in rigid cervical collar with C-spine precautions.   CARDIOVASCULAR:  Normal S1, S2.  Regular rate and rhythm.   RESPIRATORY:  Clear to auscultation bilaterally.  No wheezes, rales or rhonchi.  Normal respiratory effort.   GASTROINTESTINAL:  Abdomen is soft, nontender, nondistended.   MUSCULOSKELETAL:  Moves all extremities equally.  No extremity edema.  Tender over the left lateral hip with no palpable hematoma or edema.  No palpable crepitus.  Normal active range of motion at the left hip.  He is also mildly tender over the left lateral shoulder with no visible or palpable deformity or crepitus.  He is near normal range of motion of the left shoulder.  He has diffuse midline back pain as well as into the paraspinal musculature bilaterally.   SKIN:  Warm and dry.  No rashes, lesions or ecchymoses.  Slight abrasions as noted on the scalp.   NEUROLOGIC:  Alert and oriented x3.  No focal neurologic deficits.  GCS 15.  Speech is normal.  Sensation intact to light touch over all dermatomes of the bilateral upper extremities and bilateral lower extremities.  No saddle anesthesia.  5/5 strength on finger abduction, thumb opposition and wrist extension bilaterally.   PSYCHIATRIC:  Normal affect.  Pleasant male.      LABORATORY AND DIAGNOSTICS:  A noncontrast CT of the head was negative without contrast.      Final read by Dr. Edgar Jones.        Cervical spine CT without contrast.      IMPRESSION:    1.  No evidence for fracture or any posterior malalignment.   2.  Small central disk protrusion at C4-C5 with secondary mild central canal stenosis.   3.  Minimal C5-C6 degenerative disk disease  without significant stenosis.      Edgar Jones MD.      X-ray pelvis and hip, left 2 views.      IMPRESSION:    No change.  Negative for fracture, dislocation.      Edgar Jones MD.      Lumbar spine x-ray, 2-3 views.      IMPRESSION:  Normal.       Edgar Jones MD.       Thoracic spine x-ray, 3 views.      IMPRESSION:    No fractures or dislocations.  Final read per Radiology.        Shoulder x-ray, left.      IMPRESSION:  No fracture or dislocation on preliminary read by Radiology.       EMERGENCY DEPARTMENT COURSE:  Eladio Navarro is a 31-year-old male who presents to the emergency room with multiple painfully areas after he had a reported fall from standing on a slippery driveway.  He was complaining of a severe headache and therefore, a head CT was obtained as well as cervical spine CT given the amount of pain with concerns for possible fracture.  He did not have any fractures noted or intracranial hemorrhage noted on imaging.  Imaging was also undertaken of the other tender areas including his back, left shoulder and his left hip.  No evidence of fracture on any of these imaging tests.  He was complaining of abrasions over the back of the head, some of which looked more consistent with scalp dermatitis rather than acute traumatic abrasions.  These areas were cleaned nonetheless looking for any foreign bodies.  There were no surrounding signs of infection.  There were no underlying hematoma or skull fractures.  These areas were covered with bacitracin.  He is recommended to followup as needed in 5-7 days.  Tramadol as needed for pain and Flexeril as needed for muscle spasm.  Recommend ibuprofen and Tylenol as he tolerates.  Also sent home with a prescription for Lidoderm patch, which may be helpful overlying the affected areas.  At this time, in a young healthy patient with no evidence of any worrisome pathology on screening, I feel comfortable discharging him home.  Recommended to see a primary care  physician in 1 week if needed.  Return immediately to the Emergency Department should any new symptoms occur that he is concerned about.  All questions were answered prior to discharge.      DIAGNOSES:   1.  Closed head injury.   2.  Scalp abrasion.   3.  Acute cervical strain.     4.  Back strain.   5.  Contusion of left hip.   6.  Left shoulder strain.   7.  Abrasion of right wrist.         HARLEEN JENSEN MD             D: 2017 23:02   T: 02/15/2017 07:27   MT: #[id number]      Name:     LORENZO ALLEN   MRN:      [id number]        Account:      [id number]   :      1985           Visit Date:   2017      Document: [id number]

## 2017-02-15 NOTE — DISCHARGE INSTRUCTIONS
Discharge Instructions  Head Injury    You have been seen today for a head injury. You were checked for serious problems, like bleeding on the brain, but these problems cannot always be found right away.  Due to this risk, you should not be alone for 24 hours after your injury.  Follow up with your regular physician in 5-7 days. If you are taking a blood thinner, such as aspirin, Pradaxa  (dabigatran), Coumadin  (warfarin), or Plavix  (clopidogrel), you are at especially high risk for immediate or delayed bleeding, and need to re-check with a physician in 24 hours, or sooner if any of the symptoms below happen.     Return to the Emergency Department if:    You are confused, have amnesia, or you are not acting right.    Your headache gets worse or you start to have a really bad headache even with your recommended treatment plan.    You vomit more than once.    You have a convulsion or seizure.    You have trouble walking.    You have weakness or paralysis in an arm or a leg.    You have blood or fluid coming from your ears or nose.    You have new symptoms or anything that worries you.    Sleeping:  It is okay for you to sleep, but someone should wake you up as instructed by your doctor, and someone should check on you at your usual time to wake up.     Activity:    Do not drive for at least 24 hours.    Do not drive if you have dizzy spells or trouble concentrating, or remembering things.    Do not return to any contact sports until cleared by your regular doctor.     Follow-up:  It is very important that you make an appointment with your clinic and go to the appointment.  If you do not follow-up with your regular doctor, it may result in missing an important development which could result in permanent injury or disability and/or lasting pain.  If there is any problem keeping your appointment, call your doctor or return to the Emergency Department.    MORE INFORMATION:    Concussion:  A concussion is a minor head  injury that may cause temporary problems with the way your brain works.  Some symptoms include:  confusion, amnesia, nausea and vomiting, dizziness, fatigue, memory or concentration problems, irritability and sleep problems.    CT Scans: Your evaluation today may have included a CT scan (CAT scan) to look for things like bleeding or a skull fracture (break).  CT scans involve radiation and too many CT scans can cause serious health problems like cancer, especially in children.  Because of this, your doctor may not have ordered a CT scan today if they think you are at low risk for a serious or life threatening problem.    If you were given a prescription for medicine here today, be sure to read all of the information (including the package insert) that comes with your prescription.  This will include important information about the medicine, its side effects, and any warnings that you need to know about.  The pharmacist who fills the prescription can provide more information and answer questions you may have about the medicine.  If you have questions or concerns that the pharmacist cannot address, please call or return to the Emergency Department.     Opioid Medication Information    Pain medications are among the most commonly prescribed medicines, so we are including this information for all our patients. If you did not receive pain medication or get a prescription for pain medicine, you can ignore it.     You may have been given a prescription for an opioid (narcotic) pain medicine and/or have received a pain medicine while here in the Emergency Department. These medicines can make you drowsy or impaired. You must not drive, operate dangerous equipment, or engage in any other dangerous activities while taking these medications. If you drive while taking these medications, you could be arrested for DUI, or driving under the influence. Do not drink any alcohol while you are taking these medications.     Opioid pain  medications can cause addiction. If you have a history of chemical dependency of any type, you are at a higher risk of becoming addicted to pain medications.  Only take these prescribed medications to treat your pain when all other options have been tried. Take it for as short a time and as few doses as possible. Store your pain pills in a secure place, as they are frequently stolen and provide a dangerous opportunity for children or visitors in your house to start abusing these powerful medications. We will not replace any lost or stolen medicine.  As soon as your pain is better, you should flush all your remaining medication.     Many prescription pain medications contain Tylenol  (acetaminophen), including Vicodin , Tylenol #3 , Norco , Lortab , and Percocet .  You should not take any extra pills of Tylenol  if you are using these prescription medications or you can get very sick.  Do not ever take more than 3000 mg of acetaminophen in any 24 hour period.    All opioids tend to cause constipation. Drink plenty of water and eat foods that have a lot of fiber, such as fruits, vegetables, prune juice, apple juice and high fiber cereal.  Take a laxative if you don t move your bowels at least every other day. Miralax , Milk of Magnesia, Colace , or Senna  can be used to keep you regular.      Remember that you can always come back to the Emergency Department if you are not able to see your regular doctor in the amount of time listed above, if you get any new symptoms, or if there is anything that worries you.        Discharge Instructions  Neck Strain    You have been seen today for a neck sprain or strain.  Neck strains usually result from an injury to the neck. Car accidents, contact sports and falls are common causes of neck strain. Sometimes your neck can start to hurt because of increased activity, muscle tension, an abnormal sleeping position, or because of other problems like arthritis in the neck.     Neck  pain usually comes from injured muscles and ligaments. Sometimes there is a herniated ( slipped ) disc. We don t usually do MRI scans to look for these right away, since most herniated discs will get better on their own with time. Today, we did not find any evidence that your neck pain was caused by a serious condition, such as an infection, fracture, or tumor. However, sometimes symptoms develop over time and cannot be found during an emergency visit, so it is very important that you follow up with your primary doctor.    Return to the Emergency Department if:    You have increasing pain in your neck.    You develop difficulty swallowing or breathing.    You have numbness, weakness, or trouble moving your arms or legs.    You have severe dizziness and difficulty walking.    You are unable to control your bladder or bowels.    You develop severe headache or ringing in the ears.    Call your doctor if:     Your neck pain is not controlled with the medicine we gave you.    You are not back to normal within 1 week.    What can I do to help myself at home?    If you had an injury, use cold for the first 1-2 days. Cold helps relieve pain and reduce inflammation.  Apply ice packs to the neck or areas of pain every 1-2 hours for 20 minutes at a time. Place a towel or cloth between your skin and the ice pack.    After the first 2 days, using heat can help with neck pain and stiffness. You may use a warm shower or bath, warm towels on the neck, or a heating pad. Do not sleep with a heating pad, as you can be burned.     Pain medications - You may take a pain medication such as Tylenol  (acetaminophen), Advil , Nuprin  (ibuprofen) or Aleve  (naproxen).  If you have been given a narcotic such as Vicodin  (hydrocodone with acetaminophen), Percocet  (oxycodone with acetaminophen), codeine, or a muscle relaxant such as Flexeril  (cyclobenzaprine) or Soma  (carisoprodol), do not drive for four hours after you have taken it. If the  narcotic contains Tylenol  (acetaminophen), do not take Tylenol  with it. All narcotics will cause constipation, so eat a high fiber diet.      It is usually best to rest the neck for 1-2 days after an injury, then start gentle stretching exercises.     It is helpful to place a small pillow under the nape of your neck to provide proper neutral positioning.     You should stay active and do your usual work as much as you can, unless this involves heavy physical labor. Ask your doctor if you need work restrictions.  If you were given a prescription for medicine here today, be sure to read all of the information (including the package insert) that comes with your prescription.  This will include important information about the medicine, its side effects, and any warnings that you need to know about.  The pharmacist who fills the prescription can provide more information and answer questions you may have about the medicine.  If you have questions or concerns that the pharmacist cannot address, please call or return to the Emergency Department.   Remember that you can always come back to the Emergency Department if you are not able to see your regular doctor in the amount of time listed above, if you get any new symptoms, or if there is anything that worries you.      Abrasions  Abrasions are skin scrapes. Their treatment depends on how large and deep the abrasion is.  Home care   You may be prescribed an antibiotic cream or ointment to apply to the wound. This helps prevent infection. Follow instructions when using this medication.  General care    To care for the abrasion, do the following each day for as long as directed by your health care provider.    If you were given a bandage, change it once a day. If your bandage sticks to the wound, soak it in warm water until it loosens.    Wash the area with soap and warm water. You may do this in a sink or under a tub faucet or shower. Rinse off the soap. Then pat the area  dry with a clean towel.    If antibiotic ointment or cream was prescribed, reapply it to the wound as directed. Cover the wound with a fresh non-stick bandage. If the bandage becomes wet or dirty, change it as soon as possible.    You may use acetaminophen or ibuprofen to control pain unless another pain medication was prescribed. Note: If you have chronic liver or kidney disease or ever had a stomach ulcer or GI bleeding, talk with your health care provider before using these medications. Do not use ibuprofen in children under six months of age.    Most skin wounds heal within ten days. But an infection may occur despite treatment. Therefore, monitor the wound for signs of infection as listed below.  Follow-up care  Follow up with your health care provider, or as advised.  When to seek medical advice  Call your health care provider right away if any of these occur:    Fever of 101 F (38.3 C) or higher, or as directed by your health care provider    Increasing pain, redness, swelling, or drainage from the wound    Bleeding from the wound that does not stop after a few minutes of steady, firm pressure    Decreased ability to move any body part near wound    [phone] [employer]]. [address] [place] [address]. All rights reserved. This information is not intended as a substitute for professional medical care. Always follow your healthcare professional's instructions.          Back Sprain or Strain    Injury to the muscles (strain) or ligaments (sprain) around the spine can be troubling. Injury may occur after a sudden forceful twisting or bending force such as in a car accident, after a simple awkward movement, or after lifting something heavy with poor body positioning. In any case, muscle spasm is often present and adds to the pain.  Thankfully, most people feel better in 1 to 2 weeks, and most of the rest in 1 to 2 months. Most people can remain active. Unless you had a forceful physical  injury such as a car accident or fall, X-rays are usually not ordered for the first evaluation of a back sprain or strain. If pain continues and does not respond to medical treatment, your healthcare provider may order X-rays and other tests.  Home care  The following guidelines will help you care for your injury at home:    When in bed, try to find a comfortable position. A firm mattress is best. Try lying flat on your back with pillows under your knees. You can also try lying on your side with your knees bent up toward your chest and a pillow between your knees.    Don't sit for long periods. Try not to take long car rides or take other trips that have you sitting for a long time. This puts more stress on the lower back than standing or walking.    During the first 24 to 72 hours after an injury or flare-up, apply an ice pack to the painful area for 20 minutes. Then remove it for 20 minutes. Do this for 60 to 90 minutes, or several times a day, This will reduce swelling and pain. Be sure to wrap the ice pack in a thin towel or plastic to protect your skin.    You can start with ice, then switch to heat. Heat from a hot shower, hot bath, or heating pad reduces swelling and pain and works well for muscle spasms. Put heat on the painful area for 20 minutes, then remove for 20 minutes. Do this for 60 to 90 minutes, or several times a day. Do not use a heating pad while sleeping. It can burn the skin.    You can alternate the ice and heat. Talk with your healthcare provider to find out the best treatment or therapy for your back pain.    Therapeutic massage will help relax the back muscles without stretching them.    Be aware of safe lifting methods. Do not lift anything over 15 pounds until all of the pain is gone.  Medicines  Talk to your healthcare provider before using medicines, especially if you have other health problems or are taking other medicines.    You may use acetaminophen or ibuprofen to control pain,  unless another pain medicine was prescribed. If you have chronic conditions like diabetes, liver or kidney disease, stomach ulcers, or gastrointestinal bleeding, or are taking blood-thinner medicines, talk with your doctor before taking any medicines.    Be careful if you are given prescription medicines, narcotics, or medicine for muscle spasm. They can cause drowsiness, and affect your coordination, reflexes, and judgment. Do not drive or operate heavy machinery.  Follow-up care  Follow up with your healthcare provider or this facility as advised. You may need physical therapy or more tests if your symptoms get worse.  If you had X-rays today, they didn t show any broken bones, breaks, or fractures. Sometimes fractures don t show up on the first X-ray. Bruises and sprains can sometimes hurt as much as a fracture. These injuries can take time to heal completely. If your symptoms don t improve or they get worse, talk with your healthcare provider. You may need a repeat X-ray.  Call 911  Call for emergency care if any of the following occur:    Trouble breathing    Confused    Very drowsy or trouble awakening    Fainting or loss of consciousness    Rapid or very slow heart rate    Loss of bowel or bladder control  When to seek medical advice  Call your healthcare provider right away if any of the following occur:    Pain gets worse or spreads to your arms or legs    Weakness or numbness in one or both arms or legs    Numbness in the groin or genital area    [id number] [employer]. [address] [place] [address]. All rights reserved. This information is not intended as a substitute for professional medical care. Always follow your healthcare professional's instructions.          Hip Contusion       A contusion is another word for a bruise. It happens when small blood vessels break open and leak blood into the nearby area. A hip contusion can result from a bump, hit, or fall. Symptoms of a  contusion often include changes in skin color (bruising), swelling, and pain. It may take several hours for a deep bruise to show up. If the injury is severe, you may need an x-ray to check for broken bones.  Swelling should decrease in a few days. Bruising and pain may take several weeks to go away.  Home care    Unless another medicine was prescribed, you may take acetaminophen, ibuprofen, or naproxen to help relieve pain and swelling. If needed, stronger pain medicines may be prescribed. Take all medicines exactly as directed.    Ice the bruised area to help reduce pain and swelling. Wrap a cold source (ice pack or ice cubes in a plastic bag) in a thin towel. Apply the cold source to the bruised area for 20 minutes every 1 to 2 hours the first day. Continue this 3 to 4 times a day until the pain and swelling goes away.    If walking causes pain, use crutches or a walker until you can walk without pain. These items can be rented at most pharmacies and orthopedic supply stores.    If your injury is keeping you from moving around or caring for yourself properly, you may qualify for services such as home health care. Check with your insurance company to see if this type of care is covered.  Follow-up  Follow up with your healthcare provider, or as advised.  When to seek medical advice   Call your healthcare provider right away if any of these occur:    Increased pain, bruising, or swelling near the injured area    Decreased ability to bear weight on the injured side    Pain or swelling develops below the knee    Chest pain or shortness of breath    [id number] [employer]. [address] [place] [address], [place] [address]. All rights reserved. This information is not intended as a substitute for professional medical care. Always follow your healthcare professional's instructions.          Muscle Strain in the Extremities  A muscle strain is a stretching and tearing of muscle fibers. This causes pain, especially  when you move that muscle. There may also be some swelling and bruising.  Home care    Keep the hurt area raised to reduce pain and swelling. This is especially important during the first 48 hours.    Apply an ice pack over the injured area for 15 to 20 minutes every 3 to 6 hours. You should do this for the first 24 to 48 hours. You can make an ice pack by filling a plastic bag that seals at the top with ice cubes and then wrapping it with a thin towel. Be careful not to injure your skin with the ice treatments. Ice should never be applied directly to skin. Continue the use of ice packs for relief of pain and swelling as needed. After 48 hours, apply heat (warm shower or warm bath) for 15 to 20 minutes several times a day, or alternate ice and heat.    You may use over-the-counter pain medicine to control pain, unless another medicine was prescribed. If you have chronic liver or kidney disease or ever had a stomach ulcer or GI bleeding, talk with your healthcare provider before using these medicines.    For leg strains: If crutches have been recommended, don t put full weight on the hurt leg until you can do so without pain. You can return to sports when you are able to hop and run on the injured leg without pain.  Follow-up care  Follow up with your healthcare provider, or as advised.  When to seek medical advice  Call your healthcare provider right away if any of these occur:    The toes of the injured leg become swollen, cold, blue, numb, or tingly    Pain or swelling increases    [id number] [employer]. [address] [place] [address], [place] [address]. All rights reserved. This information is not intended as a substitute for professional medical care. Always follow your healthcare professional's instructions.
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
## 2017-02-18 ENCOUNTER — HOSPITAL ENCOUNTER (EMERGENCY)
Facility: CLINIC | Age: 32
Discharge: HOME | End: 2017-02-18
Attending: NURSE PRACTITIONER | Admitting: NURSE PRACTITIONER
Payer: COMMERCIAL

## 2017-02-18 VITALS
TEMPERATURE: 99.2 F | DIASTOLIC BLOOD PRESSURE: 94 MMHG | RESPIRATION RATE: 20 BRPM | SYSTOLIC BLOOD PRESSURE: 138 MMHG | HEART RATE: 88 BPM | OXYGEN SATURATION: 100 %

## 2017-02-18 DIAGNOSIS — M79.18 MUSCULOSKELETAL PAIN: ICD-10-CM

## 2017-02-18 PROCEDURE — 99282 EMERGENCY DEPT VISIT SF MDM: CPT

## 2017-02-18 RX ORDER — OXYCODONE AND ACETAMINOPHEN 5; 325 MG/1; MG/1
1-2 TABLET ORAL EVERY 6 HOURS PRN
Qty: 20 TABLET | Refills: 0 | Status: SHIPPED | OUTPATIENT
Start: 2017-02-18 | End: 2017-04-26

## 2017-02-18 RX ORDER — CYCLOBENZAPRINE HCL 10 MG
10 TABLET ORAL 3 TIMES DAILY PRN
Qty: 20 TABLET | Refills: 0 | Status: SHIPPED | OUTPATIENT
Start: 2017-02-18 | End: 2017-02-24

## 2017-02-18 ASSESSMENT — ENCOUNTER SYMPTOMS
NEUROLOGICAL NEGATIVE: 1
CARDIOVASCULAR NEGATIVE: 1
RESPIRATORY NEGATIVE: 1
CONSTITUTIONAL NEGATIVE: 1

## 2017-02-18 NOTE — ED NOTES
Pt slipped and fell on ice on Tuesday and is here for a recheck.  He is now having neck pain, shoulder pain and left hip pain.  He also has headache.

## 2017-02-18 NOTE — ED AVS SNAPSHOT
[facility] Emergency Department    [address]    [facility] [address]    Phone:  [phone]    Fax:  [phone]                                       Eladio Navarro   MRN: [id number]    Department:  [facility] Emergency Department   Date of Visit:  2/18/2017           After Visit Summary Signature Page     I have received my discharge instructions, and my questions have been answered. I have discussed any challenges I see with this plan with the nurse or doctor.    ..........................................................................................................................................  Patient/Patient Representative Signature      ..........................................................................................................................................  Patient Representative Print Name and Relationship to Patient    ..................................................               ................................................  Date                                            Time    ..........................................................................................................................................  Reviewed by Signature/Title    ...................................................              ..............................................  Date                                                            Time

## 2017-02-18 NOTE — ED PROVIDER NOTES
History     Chief Complaint:    Fall and Neck Pain      HPI   Eladio Navarro is a 31 year old male who presents stating he slipped and fell on ice on Tuesday and is here for a recheck. He is now having neck pain, shoulder pain and left hip pain. He also has headache. Denies new trauma. No visual disturbance, no nausea, no chest pain, no numbness to upper extremities was doing well until he run out of the pain medications that were helping. No other medial concerns today.    Allergies:    No Known Allergies     Medications:        oxyCODONE-acetaminophen (PERCOCET) 5-325 MG per tablet   cyclobenzaprine (FLEXERIL) 10 MG tablet   traMADol (ULTRAM) 50 MG tablet   lidocaine (LIDODERM) 5 % Patch   cyclobenzaprine (FLEXERIL) 10 MG tablet       Problem List:      Patient Active Problem List    Diagnosis Date Noted     Arthralgia of left hip 04/29/2016     Priority: Medium        Past Medical History:      Past Medical History   Diagnosis Date     Chronic shoulder pain      Degenerative disc disease, cervical        Past Surgical History:      Past Surgical History   Procedure Laterality Date     Orthopedic surgery         Family History:      Family History   Problem Relation Age of Onset     DIABETES Maternal Grandmother        Social History:    Marital Status:  Single [1]  Social History   Substance Use Topics     Smoking status: Never Smoker     Smokeless tobacco: Not on file     Alcohol use No        Review of Systems   Constitutional: Negative.    Respiratory: Negative.    Cardiovascular: Negative.    Genitourinary: Negative.    Neurological: Negative.                Physical Exam   First Vitals:  BP: (!) 138/94  Pulse: 88  Heart Rate: 88  Temp: 99.2  F (37.3  C)  Resp: 20  SpO2: 100 %      Physical Exam    General: Appears stated age  HENT: Atraumatic.  Eyes: No scleral injection, conjunctivitis, or drainage.    Neck: Supple with normal RROM. No midline C-spine tenderness.   Cardio: Regular rate and rhythm,  no murmurs, rubs, or gallops  Pulmonary/Chest: Clear to ausculation bilaterally.    Musculoskeletal: No bony tenderness on exam to shoulder or left hip, normal ROM, no bruising over skin.     Neuro: Alert and oriented, no focal deficits noted.   Skin: Normal color and temperature, no rashes to visible exposed skin.   Psych: Mood and affect normal.          Emergency Department Course     Emergency Department Course:    ED Course       Impression & Plan      Medical Decision Making:  Eladio Chapman male presents stating he slipped and fell on ice on Tuesday and is here for a recheck. Based on today's exam no indication for imaging today patient agrees. Patient will receive limited supply of pain medications he has PT starting next week.  Patient given clear instructions to return to ED if develops fever, increased pain, limited ROM or any other concerns. Patient is stable to discharge home with close PCP follow up. Patient verbalizes understanding of instructions.     Diagnosis:    ICD-10-CM    1. Musculoskeletal pain M79.1        Disposition:    Discharge Medications:  Discharge Medication List as of 2/18/2017  5:29 PM      START taking these medications    Details   oxyCODONE-acetaminophen (PERCOCET) 5-325 MG per tablet Take 1-2 tablets by mouth every 6 hours as needed for moderate to severe pain, Disp-20 tablet, R-0, Local Print      !! cyclobenzaprine (FLEXERIL) 10 MG tablet Take 1 tablet (10 mg) by mouth 3 times daily as needed for muscle spasms, Disp-20 tablet, R-0, Local Print       !! - Potential duplicate medications found. Please discuss with provider.            Mahi Tonye  2/18/2017   [facility] EMERGENCY DEPARTMENT       Mahi Toney, APRN CNP  02/18/17 1946

## 2017-02-18 NOTE — ED AVS SNAPSHOT
[facility] Emergency Department    [address] [place] [address]    Phone:  [phone]    Fax:  [phone]                                       Eladio Navarro   MRN: [id number]    Department:  [facility] Emergency Department   Date of Visit:  2/18/2017           Patient Information     Date Of Birth          1985        Your diagnoses for this visit were:     Musculoskeletal pain        You were seen by Mahi Toney, LISSETT CHRISTIANSON.      Follow-up Information     Follow up with No Ref-Primary, Physician In 1 week.      Discharge References/Attachments     BACK AND NECK PAIN, GENERAL (ENGLISH)      24 Hour Appointment Hotline       To make an appointment at any [facility] clinic, call [phone] ([phone]). If you don't have a family doctor or clinic, we will help you find one. [facility] clinics are conveniently located to serve the needs of you and your family.             Review of your medicines      START taking        Dose / Directions Last dose taken    oxyCODONE-acetaminophen 5-325 MG per tablet   Commonly known as:  PERCOCET   Dose:  1-2 tablet   Quantity:  20 tablet        Take 1-2 tablets by mouth every 6 hours as needed for moderate to severe pain   Refills:  0          Our records show that you are taking the medicines listed below. If these are incorrect, please call your family doctor or clinic.        Dose / Directions Last dose taken    lidocaine 5 % Patch   Commonly known as:  LIDODERM   Dose:  1 patch   Quantity:  10 patch        Place 1 patch onto the skin every 24 hours for 10 days   Refills:  0        traMADol 50 MG tablet   Commonly known as:  ULTRAM   Dose:   mg   Quantity:  15 tablet        Take 1-2 tablets ( mg) by mouth every 6 hours as needed for pain   Refills:  0          ASK your doctor about these medications        Dose / Directions Last dose taken    * cyclobenzaprine 10 MG tablet   Commonly known as:   FLEXERIL   Dose:  10 mg   What changed:  Another medication with the same name was added. Make sure you understand how and when to take each.   Quantity:  20 tablet   Ask about: Which instructions should I use?        Take 1 tablet (10 mg) by mouth 3 times daily as needed for muscle spasms   Refills:  0        * cyclobenzaprine 10 MG tablet   Commonly known as:  FLEXERIL   Dose:  10 mg   What changed:  You were already taking a medication with the same name, and this prescription was added. Make sure you understand how and when to take each.   Quantity:  20 tablet   Ask about: Which instructions should I use?        Take 1 tablet (10 mg) by mouth 3 times daily as needed for muscle spasms   Refills:  0        * Notice:  This list has 2 medication(s) that are the same as other medications prescribed for you. Read the directions carefully, and ask your doctor or other care provider to review them with you.            Prescriptions were sent or printed at these locations (2 Prescriptions)                   Other Prescriptions                Printed at Department/Unit printer (2 of 2)         oxyCODONE-acetaminophen (PERCOCET) 5-325 MG per tablet               cyclobenzaprine (FLEXERIL) 10 MG tablet                Orders Needing Specimen Collection     None      Pending Results     No orders found from 2/16/2017 to 2/19/2017.            Pending Culture Results     No orders found from 2/16/2017 to 2/19/2017.             Test Results from your hospital stay            Clinical Quality Measure: Blood Pressure Screening     Your blood pressure was checked while you were in the emergency department today. The last reading we obtained was  BP: (!) 138/94 . Please read the guidelines below about what these numbers mean and what you should do about them.  If your systolic blood pressure (the top number) is less than 120 and your diastolic blood pressure (the bottom number) is less than 80, then your blood pressure is normal.  "There is nothing more that you need to do about it.  If your systolic blood pressure (the top number) is 120-139 or your diastolic blood pressure (the bottom number) is 80-89, your blood pressure may be higher than it should be. You should have your blood pressure rechecked within a year by a primary care provider.  If your systolic blood pressure (the top number) is 140 or greater or your diastolic blood pressure (the bottom number) is 90 or greater, you may have high blood pressure. High blood pressure is treatable, but if left untreated over time it can put you at risk for heart attack, stroke, or kidney failure. You should have your blood pressure rechecked by a primary care provider within the next 4 weeks.  If your provider in the emergency department today gave you specific instructions to follow-up with your doctor or provider even sooner than that, you should follow that instruction and not wait for up to 4 weeks for your follow-up visit.        Thank you for choosing [facility]       Thank you for choosing [facility] for your care. Our goal is always to provide you with excellent care. Hearing back from our patients is one way we can continue to improve our services. Please take a few minutes to complete the written survey that you may receive in the mail after you visit with us. Thank you!        [email or web address] Information     [facility] lets you send messages to your doctor, view your test results, renew your prescriptions, schedule appointments and more. To sign up, go to [email or web address] . Click on \"Log in\" on the left side of the screen, which will take you to the Welcome page. Then click on \"Sign up Now\" on the right side of the page.     You will be asked to enter the access code listed below, as well as some personal information. Please follow the directions to create your username and password.     Your access code is: [email or web address]  Expires: 3/9/2017 11:40 PM     Your access code will  in 90 days. " If you need help or a new code, please call your [facility] clinic or [phone].        Care EveryWhere ID     This is your Care EveryWhere ID. This could be used by other organizations to access your [facility] medical records  [id number]        After Visit Summary       This is your record. Keep this with you and show to your community pharmacist(s) and doctor(s) at your next visit.

## 2017-04-15 ENCOUNTER — HOSPITAL ENCOUNTER (EMERGENCY)
Facility: CLINIC | Age: 32
Discharge: HOME | End: 2017-04-15
Attending: NURSE PRACTITIONER | Admitting: NURSE PRACTITIONER
Payer: COMMERCIAL

## 2017-04-15 VITALS
RESPIRATION RATE: 20 BRPM | BODY MASS INDEX: 26.48 KG/M2 | OXYGEN SATURATION: 98 % | SYSTOLIC BLOOD PRESSURE: 167 MMHG | HEIGHT: 70 IN | WEIGHT: 185 LBS | TEMPERATURE: 98.9 F | DIASTOLIC BLOOD PRESSURE: 95 MMHG | HEART RATE: 97 BPM

## 2017-04-15 DIAGNOSIS — L03.012 CELLULITIS OF FINGER OF LEFT HAND: ICD-10-CM

## 2017-04-15 DIAGNOSIS — M79.89 SWELLING OF THUMB, LEFT: ICD-10-CM

## 2017-04-15 DIAGNOSIS — S69.92XA INJURY OF LEFT THUMB, INITIAL ENCOUNTER: ICD-10-CM

## 2017-04-15 PROCEDURE — 25000132 ZZH RX MED GY IP 250 OP 250 PS 637: Performed by: NURSE PRACTITIONER

## 2017-04-15 PROCEDURE — 99283 EMERGENCY DEPT VISIT LOW MDM: CPT

## 2017-04-15 RX ORDER — HYDROCODONE BITARTRATE AND ACETAMINOPHEN 5; 325 MG/1; MG/1
2 TABLET ORAL ONCE
Status: COMPLETED | OUTPATIENT
Start: 2017-04-15 | End: 2017-04-15

## 2017-04-15 RX ORDER — HYDROCODONE BITARTRATE AND ACETAMINOPHEN 5; 325 MG/1; MG/1
1-2 TABLET ORAL EVERY 6 HOURS PRN
Qty: 15 TABLET | Refills: 0 | Status: SHIPPED | OUTPATIENT
Start: 2017-04-15 | End: 2017-04-26

## 2017-04-15 RX ORDER — CEPHALEXIN 500 MG/1
500 CAPSULE ORAL 3 TIMES DAILY
Qty: 21 CAPSULE | Refills: 0 | Status: SHIPPED | OUTPATIENT
Start: 2017-04-15 | End: 2017-04-22

## 2017-04-15 RX ADMIN — HYDROCODONE BITARTATE AND ACETAMINOPHEN 2 TABLET: 5; 325 TABLET ORAL at 18:25

## 2017-04-15 ASSESSMENT — ENCOUNTER SYMPTOMS
PSYCHIATRIC NEGATIVE: 1
NEUROLOGICAL NEGATIVE: 1
CONSTITUTIONAL NEGATIVE: 1
CARDIOVASCULAR NEGATIVE: 1
RESPIRATORY NEGATIVE: 1
EYES NEGATIVE: 1

## 2017-04-15 NOTE — ED PROVIDER NOTES
"  History     Chief Complaint:    Fall      HPI   Eladio Navarro is a 31 year old male who presents with injury from a fall at the hotel. Patient states he was at a hotel pool with friends and slipped and landed on left side and injured left shoulder, left elbow and left thumb. Also feels like he still has water in his ears, denies hitting head, no LOC or neck pain.       Allergies:    No Known Allergies     Medications:        HYDROcodone-acetaminophen (NORCO) 5-325 MG per tablet   cephALEXin (KEFLEX) 500 MG capsule   oxyCODONE-acetaminophen (PERCOCET) 5-325 MG per tablet   traMADol (ULTRAM) 50 MG tablet       Problem List:      Patient Active Problem List    Diagnosis Date Noted     Arthralgia of left hip 04/29/2016     Priority: Medium        Past Medical History:      Past Medical History:   Diagnosis Date     Chronic shoulder pain      Degenerative disc disease, cervical        Past Surgical History:      Past Surgical History:   Procedure Laterality Date     ORTHOPEDIC SURGERY         Family History:      Family History   Problem Relation Age of Onset     DIABETES Maternal Grandmother        Social History:    Marital Status:  Single [1]  Social History   Substance Use Topics     Smoking status: Never Smoker     Smokeless tobacco: Not on file     Alcohol use No        Review of Systems   Constitutional: Negative.    HENT: Negative.    Eyes: Negative.    Respiratory: Negative.    Cardiovascular: Negative.    Genitourinary: Negative.    Musculoskeletal:        Left shoulder, elbow and thumb pain   Skin: Negative.    Neurological: Negative.    Psychiatric/Behavioral: Negative.          Physical Exam   First Vitals:  BP: (!) 167/95  Pulse: 120  Temp: 98.9  F (37.2  C)  Resp: 20  Height: 177.8 cm (5' 10\")  Weight: 83.9 kg (185 lb)  SpO2: 98 %      Physical Exam    General: Appears stated age  HENT: Atraumatic  Eyes: No scleral injection, conjunctivitis, or drainage.    Neck: Supple with normal ROM. No midline " tenderness.   Cardio: Regular rate and rhythm, no murmurs, rubs, or gallops  Pulmonary/Chest: Clear to ausculation bilaterally.    Abdomen: Soft, non-tender, normal bowel sounds, no rebound or guarding  Musculoskeletal: Left shoulder, elbow and wrist normal ROM. Left thumb warm to the touch, + swelling, normal ROM and CMS, has erythema.   Neuro: Alert and oriented, no focal deficits noted.   Skin: Normal color and temperature, no rashes to visible exposed skin.   Psych: Mood and affect normal.        Emergency Department Course     Emergency Department Course:    ED Course       Impression & Plan      Medical Decision Making:    Patient presents with injury to left shoulder, elbow and thumb, exam performed has normal ROM and sensation no indication for imaging patient agrees left thumb has swelling, redness and feels warm to the touch concerning for possible early signs of infection/celluliits, patient is concerned due to being at public pool with small superficial abrasion based thumb would like to start on antibiotics. This is reasonable patient will be started on Keflex and follow up in 3 days for recheck. Instructed to return if develops new symptoms or condition gets worse. Patient discharged in stable condition.     Diagnosis:    ICD-10-CM    1. Injury of left thumb, initial encounter S69.92XA    2. Swelling of thumb, left M79.89    3. Cellulitis of finger of left hand L03.012        Disposition:  Discharge Medications:  New Prescriptions    CEPHALEXIN (KEFLEX) 500 MG CAPSULE    Take 1 capsule (500 mg) by mouth 3 times daily for 7 days    HYDROCODONE-ACETAMINOPHEN (NORCO) 5-325 MG PER TABLET    Take 1-2 tablets by mouth every 6 hours as needed for moderate to severe pain         Mahi Toney  4/15/2017   [facility] EMERGENCY DEPARTMENT       Mahi Toney, APRN CNP  04/16/17 0002

## 2017-04-15 NOTE — ED NOTES
Patient states he was at a hotel pool with friends and slipped and landed on left side and injured left shoulder, left elbow and left thumb.  Also feels like he still has water in his ears.      ABCs intact.  Alert and oriented x 3.

## 2017-04-15 NOTE — ED NOTES
Pt complains of thumb pain. States he fell and has pain in left shoulder and elbow as well, but thumb hurts the most. States after he fell he ran his thumb under hot water to decrease the pain.    Pt resting in bed; no apparent distress. Patient's airway, breathing and circulation are all intact without need for intervention at this time. Respiration regular and easy. Patient is alert and oriented x4. Skin warm, dry with color consistent with ethnicity. Slight redness and swelling to left thumb.

## 2017-04-15 NOTE — DISCHARGE INSTRUCTIONS
Discharge Instructions for Cellulitis  You have been diagnosed with cellulitis. This is an infection in the deepest layer of the skin. In some cases, the infection also affects the muscle. Cellulitis is caused by bacteria. The bacteria can enter the body through broken skin. This can happen with a cut, scratch, animal bite, or an insect bite that has been scratched. You may have been treated in the hospital with antibiotics and fluids. You will likely be given a prescription for antibiotics to take at home. This sheet will help you take care of yourself at home.  Home Care  When you are home:    Take the prescribed antibiotic medication you are given as directed until it is gone. Take it even if you feel better. It treats the infection and stops it from returning. Not taking all of the medication can make future infections hard to treat.    Keep the infected area clean.    When possible, raise the infected area above the level of your heart. This helps keep swelling down.    Talk to your doctor if you are in pain. Ask what kind of over-the-counter medication you can take for pain.    Apply clean bandages as advised.    Take your temperature once a day for a week.    Wash your hands often to prevent spreading the infection.  In the future, wash your hands before and after you touch cuts, scratches, or bandages. This will help prevent infection.   When to Call Your Doctor  Call your doctor immediately if you have any of the following:    Vomiting    Fever of100.4 F (38 C) or higher, or as directed by your health care provider    Shaking chills    Redness that gets worse in or around the infected area    Swelling of the infected area    Pain that gets worse in or around the infected area    Difficulty or pain when moving the joints above or below the infected area    Discharge or pus draining from the area     5406-4327 [employer]. [address] [place] [address], [place] [address]. All rights reserved. This  information is not intended as a substitute for professional medical care. Always follow your healthcare professional's instructions.

## 2017-04-15 NOTE — ED AVS SNAPSHOT
[facility] Emergency Department    [address]    [facility] [address]    Phone:  [phone]    Fax:  [phone]                                       Eladio Navarro   MRN: [id number]    Department:  [facility] Emergency Department   Date of Visit:  4/15/2017           After Visit Summary Signature Page     I have received my discharge instructions, and my questions have been answered. I have discussed any challenges I see with this plan with the nurse or doctor.    ..........................................................................................................................................  Patient/Patient Representative Signature      ..........................................................................................................................................  Patient Representative Print Name and Relationship to Patient    ..................................................               ................................................  Date                                            Time    ..........................................................................................................................................  Reviewed by Signature/Title    ...................................................              ..............................................  Date                                                            Time

## 2017-04-15 NOTE — ED AVS SNAPSHOT
[facility] Emergency Department    [address] [place] [address]    Phone:  [phone]    Fax:  [phone]                                       Eladio Navarro   MRN: [id number]    Department:  [facility] Emergency Department   Date of Visit:  4/15/2017           Patient Information     Date Of Birth          1985        Your diagnoses for this visit were:     Injury of left thumb, initial encounter     Swelling of thumb, left     Cellulitis of finger of left hand        You were seen by Mahi Toney, LISSETT CHRISTIANSON.      Follow-up Information     Follow up with No Ref-Primary, Physician In 3 days.        Discharge Instructions         Discharge Instructions for Cellulitis  You have been diagnosed with cellulitis. This is an infection in the deepest layer of the skin. In some cases, the infection also affects the muscle. Cellulitis is caused by bacteria. The bacteria can enter the body through broken skin. This can happen with a cut, scratch, animal bite, or an insect bite that has been scratched. You may have been treated in the hospital with antibiotics and fluids. You will likely be given a prescription for antibiotics to take at home. This sheet will help you take care of yourself at home.  Home Care  When you are home:    Take the prescribed antibiotic medication you are given as directed until it is gone. Take it even if you feel better. It treats the infection and stops it from returning. Not taking all of the medication can make future infections hard to treat.    Keep the infected area clean.    When possible, raise the infected area above the level of your heart. This helps keep swelling down.    Talk to your doctor if you are in pain. Ask what kind of over-the-counter medication you can take for pain.    Apply clean bandages as advised.    Take your temperature once a day for a week.    Wash your hands often to prevent spreading the  infection.  In the future, wash your hands before and after you touch cuts, scratches, or bandages. This will help prevent infection.   When to Call Your Doctor  Call your doctor immediately if you have any of the following:    Vomiting    Fever of100.4 F (38 C) or higher, or as directed by your health care provider    Shaking chills    Redness that gets worse in or around the infected area    Swelling of the infected area    Pain that gets worse in or around the infected area    Difficulty or pain when moving the joints above or below the infected area    Discharge or pus draining from the area     1668-8897 [employer]. [address] [place] [address]. All rights reserved. This information is not intended as a substitute for professional medical care. Always follow your healthcare professional's instructions.          24 Hour Appointment Hotline       To make an appointment at any [facility], call [phone] ([phone]). If you don't have a family doctor or clinic, we will help you find one. [facility] clinics are conveniently located to serve the needs of you and your family.             Review of your medicines      START taking        Dose / Directions Last dose taken    cephALEXin 500 MG capsule   Commonly known as:  KEFLEX   Dose:  500 mg   Quantity:  21 capsule        Take 1 capsule (500 mg) by mouth 3 times daily for 7 days   Refills:  0        HYDROcodone-acetaminophen 5-325 MG per tablet   Commonly known as:  NORCO   Dose:  1-2 tablet   Quantity:  15 tablet        Take 1-2 tablets by mouth every 6 hours as needed for moderate to severe pain   Refills:  0          Our records show that you are taking the medicines listed below. If these are incorrect, please call your family doctor or clinic.        Dose / Directions Last dose taken    oxyCODONE-acetaminophen 5-325 MG per tablet   Commonly known as:  PERCOCET   Dose:  1-2 tablet   Quantity:  20 tablet        Take 1-2  tablets by mouth every 6 hours as needed for moderate to severe pain   Refills:  0        traMADol 50 MG tablet   Commonly known as:  ULTRAM   Dose:   mg   Quantity:  15 tablet        Take 1-2 tablets ( mg) by mouth every 6 hours as needed for pain   Refills:  0                Prescriptions were sent or printed at these locations (2 Prescriptions)                   Other Prescriptions                Printed at Department/Unit printer (2 of 2)         HYDROcodone-acetaminophen (NORCO) 5-325 MG per tablet               cephALEXin (KEFLEX) 500 MG capsule                Orders Needing Specimen Collection     None      Pending Results     No orders found from 4/13/2017 to 4/16/2017.            Pending Culture Results     No orders found from 4/13/2017 to 4/16/2017.            Test Results From Your Hospital Stay               Clinical Quality Measure: Blood Pressure Screening     Your blood pressure was checked while you were in the emergency department today. The last reading we obtained was  BP: (!) 167/95 . Please read the guidelines below about what these numbers mean and what you should do about them.  If your systolic blood pressure (the top number) is less than 120 and your diastolic blood pressure (the bottom number) is less than 80, then your blood pressure is normal. There is nothing more that you need to do about it.  If your systolic blood pressure (the top number) is 120-139 or your diastolic blood pressure (the bottom number) is 80-89, your blood pressure may be higher than it should be. You should have your blood pressure rechecked within a year by a primary care provider.  If your systolic blood pressure (the top number) is 140 or greater or your diastolic blood pressure (the bottom number) is 90 or greater, you may have high blood pressure. High blood pressure is treatable, but if left untreated over time it can put you at risk for heart attack, stroke, or kidney failure. You should have your  "blood pressure rechecked by a primary care provider within the next 4 weeks.  If your provider in the emergency department today gave you specific instructions to follow-up with your doctor or provider even sooner than that, you should follow that instruction and not wait for up to 4 weeks for your follow-up visit.        Thank you for choosing [facility]       Thank you for choosing [facility] for your care. Our goal is always to provide you with excellent care. Hearing back from our patients is one way we can continue to improve our services. Please take a few minutes to complete the written survey that you may receive in the mail after you visit with us. Thank you!        [employer] Information     [employer] lets you send messages to your doctor, view your test results, renew your prescriptions, schedule appointments and more. To sign up, go to [email or web address] . Click on \"Log in\" on the left side of the screen, which will take you to the Welcome page. Then click on \"Sign up Now\" on the right side of the page.     You will be asked to enter the access code listed below, as well as some personal information. Please follow the directions to create your username and password.     Your access code is: [email or web address]  Expires: 2017  6:17 PM     Your access code will  in 90 days. If you need help or a new code, please call your [facility] clinic or [phone].        Care EveryWhere ID     This is your Care EveryWhere ID. This could be used by other organizations to access your [facility] medical records  [id number]        After Visit Summary       This is your record. Keep this with you and show to your community pharmacist(s) and doctor(s) at your next visit.                  "

## 2017-04-18 ENCOUNTER — HOSPITAL ENCOUNTER (EMERGENCY)
Facility: CLINIC | Age: 32
Discharge: LEFT BEFORE DISCHARGE | End: 2017-04-18
Admitting: EMERGENCY MEDICINE
Payer: COMMERCIAL

## 2017-04-18 VITALS
DIASTOLIC BLOOD PRESSURE: 91 MMHG | RESPIRATION RATE: 18 BRPM | OXYGEN SATURATION: 96 % | SYSTOLIC BLOOD PRESSURE: 152 MMHG | TEMPERATURE: 98.1 F | HEART RATE: 104 BPM

## 2017-04-18 PROCEDURE — 40000268 ZZH STATISTIC NO CHARGES

## 2017-04-18 NOTE — ED NOTES
"Patient presents to the ED with pain and swelling to left thumb. Reports was seen here on Saturday following a fall and hand has gotten worse since. Also reports hearing \"like there is water in my ears.\"  "

## 2017-04-20 ENCOUNTER — HOSPITAL ENCOUNTER (EMERGENCY)
Facility: CLINIC | Age: 32
Discharge: HOME | End: 2017-04-20
Attending: EMERGENCY MEDICINE | Admitting: EMERGENCY MEDICINE
Payer: COMMERCIAL

## 2017-04-20 VITALS
DIASTOLIC BLOOD PRESSURE: 99 MMHG | WEIGHT: 190 LBS | TEMPERATURE: 97.9 F | RESPIRATION RATE: 16 BRPM | SYSTOLIC BLOOD PRESSURE: 151 MMHG | BODY MASS INDEX: 27.2 KG/M2 | OXYGEN SATURATION: 100 % | HEART RATE: 102 BPM | HEIGHT: 70 IN

## 2017-04-20 DIAGNOSIS — S60.012A CONTUSION OF LEFT THUMB WITHOUT DAMAGE TO NAIL, INITIAL ENCOUNTER: ICD-10-CM

## 2017-04-20 DIAGNOSIS — S16.1XXA CERVICAL STRAIN, INITIAL ENCOUNTER: ICD-10-CM

## 2017-04-20 DIAGNOSIS — S46.912A SHOULDER STRAIN, LEFT, INITIAL ENCOUNTER: ICD-10-CM

## 2017-04-20 DIAGNOSIS — R03.0 ELEVATED BLOOD PRESSURE READING WITHOUT DIAGNOSIS OF HYPERTENSION: ICD-10-CM
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
## 2017-04-20 PROCEDURE — 25000132 ZZH RX MED GY IP 250 OP 250 PS 637: Performed by: EMERGENCY MEDICINE

## 2017-04-20 PROCEDURE — 29130 APPL FINGER SPLINT STATIC: CPT | Mod: FA

## 2017-04-20 PROCEDURE — 99284 EMERGENCY DEPT VISIT MOD MDM: CPT

## 2017-04-20 PROCEDURE — 25000125 ZZHC RX 250: Performed by: EMERGENCY MEDICINE

## 2017-04-20 RX ORDER — HYDROCODONE BITARTRATE AND ACETAMINOPHEN 5; 325 MG/1; MG/1
1 TABLET ORAL ONCE
Status: COMPLETED | OUTPATIENT
Start: 2017-04-20 | End: 2017-04-20

## 2017-04-20 RX ORDER — GABAPENTIN 300 MG/1
300 CAPSULE ORAL 3 TIMES DAILY
Qty: 30 CAPSULE | Refills: 0 | Status: SHIPPED | OUTPATIENT
Start: 2017-04-20 | End: 2017-04-23

## 2017-04-20 RX ORDER — ONDANSETRON 4 MG/1
4 TABLET, ORALLY DISINTEGRATING ORAL ONCE
Status: COMPLETED | OUTPATIENT
Start: 2017-04-20 | End: 2017-04-20

## 2017-04-20 RX ADMIN — ONDANSETRON 4 MG: 4 TABLET, ORALLY DISINTEGRATING ORAL at 17:14

## 2017-04-20 RX ADMIN — HYDROCODONE BITARTRATE AND ACETAMINOPHEN 1 TABLET: 5; 325 TABLET ORAL at 17:13

## 2017-04-20 ASSESSMENT — ENCOUNTER SYMPTOMS
WEAKNESS: 0
JOINT SWELLING: 1
ARTHRALGIAS: 1
FEVER: 0
COLOR CHANGE: 1
NUMBNESS: 1

## 2017-04-20 NOTE — ED AVS SNAPSHOT
[facility] Emergency Department    [address]    [facility] [address]    Phone:  [phone]    Fax:  [phone]                                       Eladio Navarro   MRN: [id number]    Department:  [facility] Emergency Department   Date of Visit:  4/20/2017           Patient Information     Date Of Birth          1985        Your diagnoses for this visit were:     Fall, initial encounter     Cervical strain, initial encounter     Contusion of left thumb without damage to nail, initial encounter     Shoulder strain, left, initial encounter     Elevated blood pressure reading without diagnosis of hypertension        You were seen by Gabriel Webb MD.      Follow-up Information     [facility] Schedule an appointment as soon as possible for a visit in 4 days.    Contact information:    [address]  [address]  [facility] [id number] [phone]        Discharge Instructions       Discharge Instructions  Trauma    You were seen today for an injury due to some kind of trauma (crash, fall, etc.).  Some injuries may not show up until after you leave the Emergency Department.  It is important that you pay attention to these instructions and follow-up with your regular doctor as instructed.    Return to the Emergency Department right away if:    You have abdominal pain or bruises, chest pain, pain in a new area, or pain that is getting worse.    You get short of breath.    You develop a fever over 101 degrees.    You have weakness in your arms or legs.    You faint or you are very lightheaded.    You have any new symptoms, you are feeling weak or unusually ill, or something worries you.     Injuries to the brain are possible with any accident.  Return right away if you have confusion, vomiting more than once, difficulty walking or a headache that is getting worse. Bring a child or a person who can t  talk back if they seem to be behaving in an abnormal way.      MORE INFORMATION:    General Injuries:    Aches and pains are usually worse the day after your accident, but should not be severe, and should start getting better after that. Aches and pains are common in the neck and back.    Injuries from your accident may prevent you from working.  Follow-up with your regular doctor to get a work note and to find out how long you will not be able to work.    Pain medications or your injuries may make it unsafe for you to drive or operate machinery.    Use ice to injured areas for the first one or two days. Apply a bag of ice wrapped in a cloth for about 15 minutes at a time. You can do this as often as once an hour. Do not sleep with an ice pack, since it can burn you.     You can use non-prescription pain medicine, like Tylenol  (acetaminophen), Advil  (ibuprofen), Motrin  (ibuprofen), Nuprin  (ibuprofen) if your emergency doctor or your own doctor told you this is okay. Tylenol  (acetaminophen) is in many prescription medicines and non-prescription medicines--check all of your medicines to be sure you aren t taking more than 3000 mg per day.    Limit your activity for at least one or two days. Avoid doing things that hurt.    You need to see your doctor if any injured area is not back to normal in 1 week.    Car Accident:    If you have been on a backboard or had a neck collar on, this may make you stiff and sore.  This should get better in 1-2 days.  Return to the Emergency Department if the pain or discomfort is severe or gets worse.    Be careful of shards of glass on your body or in your belongings.    Fractures, Sprains, and Strains:    Return to the Emergency Department right away if your injured area gets more painful, if the splint or dressing seems to be too tight, if it gets numb or tingly past the injury, or if the area past the injury gets pale, blue, or cold.    Use your crutches if you were given them  today. Don t put weight on the injured area until the pain is gone.    Keep the injured area above the level of your heart while laying or sitting down.  This well help lessen the swelling (puffiness) and the pain.    You may use an elastic bandage (Ace  Wrap) if it makes you more comfortable. Wrap it just tight enough to provide mild compression, and loosen it if you get swelling past the bandage.    Note about X-rays: If you had x-rays done today, they were read by your emergency physician. They will also be read later by a radiologist. We will contact you if the radiologist thinks they show something different than the emergency physician did. Remember that there are some fractures (breaks in the bone) that can t be seen right away. Even if your x-rays today were normal, you must see your doctor in clinic to re-check.     Splints:    A splint put on in the Emergency Department is temporary. Your regular doctor or orthopedic doctor will remove it, and replace it with a cast or boot if needed.    Keep the splint dry. Cover it with a plastic bag when you wash. Even with a plastic bag, you still can t get in water or let water get right on it. If it does get wet, you should come back or see your doctor to have it replaced.    Do not put objects inside the splint to scratch.    If there is an elastic bandage (Ace  Wrap) holding the splint on this may be loosened a little to relieve pressure or pain.  If pain continues return to the Emergency Department right away.    Return if the splint starts cutting into your skin.    Do not remove your splint by yourself unless told to by your doctor. You can t take it off and put it back on again.     Wounds:    Infections can follow many injuries. Watch for fevers, redness spreading from the wound, pus or stitches that open up. Return here or see your doctor if these happen.    There can always be glass, wood, dirt or other things in any wound.  They won t always show up even on  x-rays.  If a wound doesn t heal, this may be why, and it is important to follow-up with your regular doctor. Small pieces of glass or other materials may work their way out on their own.    Cuts or scrapes may start to bleed after leaving the Emergency Department.  If this happens, hold pressure on the bleeding area with a clean cloth or put pressure over the bandage.  If the bleeding doesn t stop after you use constant pressure for   hour, you should return to the Emergency Department for further treatment.    Any bandage or dressing put on here should be removed in 12-24 hours, or as your doctor instructs. Remove the dressing sooner if it seems too tight or painful, or if it is getting numb, tingly, or pale past the dressing.    After you take off the dressing, wash the cut or scrape with soap and water once or twice a day.    Apply ointment like Bacitracin  (polypeptide antibiotic) to scrapes or cuts, and keep them covered with a Band-Aid  or gauze if possible, until they heal up or until your stitches are taken out.    Dermabond  or Steri-Strips  should be left alone and will come off by themselves.  Dissolving stitches should go away or fall out within about a week.    Regular stitches need to be taken out by your doctor in clinic.  Call today and schedule an appointment.  Leave your stitches in for as long as you were told today.    Most injuries are preventable!  As your local emergency physicians, we encourage you to:    Wear your seat belt.    Do not talk on your cell phone while driving.    Do not read or send text messages while driving.    Wear a bike or motorcycle helmet.    Wear a helmet while skiing and snowboarding.    Wear personal flotation devices at all times while on the water.    Always have your child in a car seat.    Do not allow children less than 12 years old to ride in the front seat.    Go to the CDC website to find more information on preventing injures:   http://www.cdc.gov/injury/index.html    If you were given a prescription for medicine here today, be sure to read all of the information (including the package insert) that comes with your prescription.  This will include important information about the medicine, its side effects, and any warnings that you need to know about.  The pharmacist who fills the prescription can provide more information and answer questions you may have about the medicine.  If you have questions or concerns that the pharmacist cannot address, please call or return to the Emergency Department.     Opioid Medication Information    Pain medications are among the most commonly prescribed medicines, so we are including this information for all our patients. If you did not receive pain medication or get a prescription for pain medicine, you can ignore it.     You may have been given a prescription for an opioid (narcotic) pain medicine and/or have received a pain medicine while here in the Emergency Department. These medicines can make you drowsy or impaired. You must not drive, operate dangerous equipment, or engage in any other dangerous activities while taking these medications. If you drive while taking these medications, you could be arrested for DUI, or driving under the influence. Do not drink any alcohol while you are taking these medications.     Opioid pain medications can cause addiction. If you have a history of chemical dependency of any type, you are at a higher risk of becoming addicted to pain medications.  Only take these prescribed medications to treat your pain when all other options have been tried. Take it for as short a time and as few doses as possible. Store your pain pills in a secure place, as they are frequently stolen and provide a dangerous opportunity for children or visitors in your house to start abusing these powerful medications. We will not replace any lost or stolen medicine.  As soon as your pain is better,  you should flush all your remaining medication.     Many prescription pain medications contain Tylenol  (acetaminophen), including Vicodin , Tylenol #3 , Norco , Lortab , and Percocet .  You should not take any extra pills of Tylenol  if you are using these prescription medications or you can get very sick.  Do not ever take more than 3000 mg of acetaminophen in any 24 hour period.    All opioids tend to cause constipation. Drink plenty of water and eat foods that have a lot of fiber, such as fruits, vegetables, prune juice, apple juice and high fiber cereal.  Take a laxative if you don t move your bowels at least every other day. Miralax , Milk of Magnesia, Colace , or Senna  can be used to keep you regular.      Remember that you can always come back to the Emergency Department if you are not able to see your regular doctor in the amount of time listed above, if you get any new symptoms, or if there is anything that worries you.          24 Hour Appointment Hotline       To make an appointment at any [facility], call [phone] ([phone]). If you don't have a family doctor or clinic, we will help you find one. [facility] clinics are conveniently located to serve the needs of you and your family.             Review of your medicines      START taking        Dose / Directions Last dose taken    gabapentin 300 MG capsule   Commonly known as:  NEURONTIN   Dose:  300 mg   Quantity:  30 capsule        Take 1 capsule (300 mg) by mouth 3 times daily   Refills:  0          Our records show that you are taking the medicines listed below. If these are incorrect, please call your family doctor or clinic.        Dose / Directions Last dose taken    cephALEXin 500 MG capsule   Commonly known as:  KEFLEX   Dose:  500 mg   Quantity:  21 capsule        Take 1 capsule (500 mg) by mouth 3 times daily for 7 days   Refills:  0        HYDROcodone-acetaminophen 5-325 MG per tablet   Commonly known as:  NORCO   Dose:  1-2  tablet   Quantity:  15 tablet        Take 1-2 tablets by mouth every 6 hours as needed for moderate to severe pain   Refills:  0        oxyCODONE-acetaminophen 5-325 MG per tablet   Commonly known as:  PERCOCET   Dose:  1-2 tablet   Quantity:  20 tablet        Take 1-2 tablets by mouth every 6 hours as needed for moderate to severe pain   Refills:  0        traMADol 50 MG tablet   Commonly known as:  ULTRAM   Dose:   mg   Quantity:  15 tablet        Take 1-2 tablets ( mg) by mouth every 6 hours as needed for pain   Refills:  0                Prescriptions were sent or printed at these locations (1 Prescription)                   Other Prescriptions                Printed at Department/Unit printer (1 of 1)         gabapentin (NEURONTIN) 300 MG capsule                Orders Needing Specimen Collection     None      Pending Results     No orders found from 4/18/2017 to 4/21/2017.            Pending Culture Results     No orders found from 4/18/2017 to 4/21/2017.            Test Results From Your Hospital Stay               Clinical Quality Measure: Blood Pressure Screening     Your blood pressure was checked while you were in the emergency department today. The last reading we obtained was  BP: (!) 151/99 . Please read the guidelines below about what these numbers mean and what you should do about them.  If your systolic blood pressure (the top number) is less than 120 and your diastolic blood pressure (the bottom number) is less than 80, then your blood pressure is normal. There is nothing more that you need to do about it.  If your systolic blood pressure (the top number) is 120-139 or your diastolic blood pressure (the bottom number) is 80-89, your blood pressure may be higher than it should be. You should have your blood pressure rechecked within a year by a primary care provider.  If your systolic blood pressure (the top number) is 140 or greater or your diastolic blood pressure (the bottom number) is  "90 or greater, you may have high blood pressure. High blood pressure is treatable, but if left untreated over time it can put you at risk for heart attack, stroke, or kidney failure. You should have your blood pressure rechecked by a primary care provider within the next 4 weeks.  If your provider in the emergency department today gave you specific instructions to follow-up with your doctor or provider even sooner than that, you should follow that instruction and not wait for up to 4 weeks for your follow-up visit.        Thank you for choosing [facility]       Thank you for choosing [facility] for your care. Our goal is always to provide you with excellent care. Hearing back from our patients is one way we can continue to improve our services. Please take a few minutes to complete the written survey that you may receive in the mail after you visit with us. Thank you!        [employer] Information     [employer] lets you send messages to your doctor, view your test results, renew your prescriptions, schedule appointments and more. To sign up, go to [email or web address] . Click on \"Log in\" on the left side of the screen, which will take you to the Welcome page. Then click on \"Sign up Now\" on the right side of the page.     You will be asked to enter the access code listed below, as well as some personal information. Please follow the directions to create your username and password.     Your access code is: [email or web address]  Expires: 2017  6:17 PM     Your access code will  in 90 days. If you need help or a new code, please call your [facility] clinic or [phone].        Care EveryWhere ID     This is your Care EveryWhere ID. This could be used by other organizations to access your [facility] medical records  [id number]        After Visit Summary       This is your record. Keep this with you and show to your community pharmacist(s) and doctor(s) at your next visit.                  "

## 2017-04-20 NOTE — ED PROVIDER NOTES
"  History     Chief Complaint:  Thumb Discomfort    HPI   Eladio Navarro is a 31 year old right handed male with chronic shoulder pain and cervical DDD who presents to the emergency department today for evaluation of persistent left thumb discomfort. Patient reports falling onto left side on 04/16 and developing pain to his left shoulder and left hand. He was first evaluated on 04/16 at [facility] and was started on Keflex for management of apparent infection, per patient. Patient later presented to [facility] for evaluation of persistent pain, and was subsequently switched to Bactrim and started on a muscle relaxer; at that time he had xray that was negative, per patient. Today, patient states that left sided pain has persisted with new swelling and redness around his left thumb that ultimately prompted his visit. Patient works extensively with his hand as apart of his employment. He also notes some associated numbness. Patient denies weakness or fever. No other concerns were voiced at this time.     Allergies:  No Known Drug Allergies    Medications:    Norco  Keflex  Ultram    Past Medical History:    Chronic shoulder pain  Degenerative disc disease, cervical     Past Surgical History:    Orthopedic surgery     Family History:    Maternal Grandmother: Diabetes     Social History:  The patient was accompanied to the ED by no one.  Smoking Status: Never Smoker  Alcohol Use: Neg   Marital Status:  Single [1]     Review of Systems   Constitutional: Negative for fever.   Musculoskeletal: Positive for arthralgias and joint swelling.   Skin: Positive for color change.   Neurological: Positive for numbness. Negative for weakness.   All other systems reviewed and are negative.    Physical Exam   Vitals:  Patient Vitals for the past 24 hrs:   BP Temp Temp src Pulse Resp SpO2 Height Weight   04/20/17 1610 (!) 151/99 97.9  F (36.6  C) Oral 102 16 100 % 1.778 m (5' 10\") 86.2 kg (190 lb)       Physical Exam    General:  " No respiratory distress    Head/neck: Fluid behind both TMs, R>L    Cardiovascular: Good cap refill.    Respiratory: Breathing non labored.     Musculoskeletal: No bony deformity. Tenderness over left neck and medial left upper extremity. Pain with abduction of left shoulder. Left thumb is swollen with tenderness over distal phalanx    Skin: Minimal erythema proximal to nail bed with swelling but no fluctuant and no visible puss.     Emergency Department Course     Interventions:  1713 Norco 1 tablet Oral   1714 Zofran 4 mg Sublingual    Emergency Department Course:  Nursing notes and vitals reviewed.  I performed an exam of the patient as documented above.     At 1641 the patient was evaluated and we discussed plan of care. We also discussed indication for additional imaging and, after extensive review of the risks and benefits, we both agreed to withhold this at this time.  Patient provided Alumafoam splint while here in the ED.     I personally reviewed the treatment plan with the Patient and answered all related questions prior to discharge.    Impression & Plan      Medical Decision Making:  Eladio Navarro is a 31 year old male who reports that he has been seen on 04/15 after fall by a hot tub at a motel pool. It sounds like he sustained some injury to his shoulder as well as his neck but, per the chart, there are some chronic injuries there. He did follow up with [facility] earlier this week and had xray's that were negative, including of his thumb. He was seen here originally and no xray's had been performed and no xray's were performed. He was started on antibiotics. It sounds like there has been swelling and pain but no fevers, chills, significant erythema. I discussed with the patient that I do not feel that this is likely infected, but much more trauma related to his thumb. We discussed repeated xray's, changing antibiotic, but the patient felt comfortable holding off. Instead, because the patient works  with his hands I recommended splinting his hand and following with orthopedics regarding his neck pain, shoulder pain, and thumb to see if they would recommend steroid injections of the neck, or recommend MRI or other studies of the finger, and the patient was discharged in good condition. The question of whether there had been an infection, the patient had problems concentrating, but it sounds like it might be secondary to pain per the patient but he said that he did hit his head. Patient felt comfortable holding off on an CT scan.     Diagnosis:    ICD-10-CM    1. Fall, initial encounter W19.XXXA    2. Cervical strain, initial encounter S16.1XXA    3. Contusion of left thumb without damage to nail, initial encounter S60.012A    4. Shoulder strain, left, initial encounter S46.912A    5. Concussion      6. Elevated blood pressure reading without diagnosis of hypertension R03.0        Disposition:   Discharged to home. Plan for follow up with PCP.    Discharge Medications:  New Prescriptions    GABAPENTIN (NEURONTIN) 300 MG CAPSULE    Take 1 capsule (300 mg) by mouth 3 times daily       Scribe Disclosure:  I, Jimmy King, am serving as a scribe at 4:27 PM on 4/20/2017 to document services personally performed by Gabriel Webb MD, based on my observations and the provider's statements to me.  [facility] EMERGENCY DEPARTMENT       Gabriel Webb MD  04/20/17 5438

## 2017-04-20 NOTE — ED AVS SNAPSHOT
[facility] Emergency Department    [address]    [facility] [address]    Phone:  [phone]    Fax:  [phone]                                       Eladio Navarro   MRN: [id number]    Department:  [facility] Emergency Department   Date of Visit:  4/20/2017           After Visit Summary Signature Page     I have received my discharge instructions, and my questions have been answered. I have discussed any challenges I see with this plan with the nurse or doctor.    ..........................................................................................................................................  Patient/Patient Representative Signature      ..........................................................................................................................................  Patient Representative Print Name and Relationship to Patient    ..................................................               ................................................  Date                                            Time    ..........................................................................................................................................  Reviewed by Signature/Title    ...................................................              ..............................................  Date                                                            Time

## 2017-04-20 NOTE — DISCHARGE INSTRUCTIONS
Discharge Instructions  Trauma    You were seen today for an injury due to some kind of trauma (crash, fall, etc.).  Some injuries may not show up until after you leave the Emergency Department.  It is important that you pay attention to these instructions and follow-up with your regular doctor as instructed.    Return to the Emergency Department right away if:    You have abdominal pain or bruises, chest pain, pain in a new area, or pain that is getting worse.    You get short of breath.    You develop a fever over 101 degrees.    You have weakness in your arms or legs.    You faint or you are very lightheaded.    You have any new symptoms, you are feeling weak or unusually ill, or something worries you.     Injuries to the brain are possible with any accident.  Return right away if you have confusion, vomiting more than once, difficulty walking or a headache that is getting worse. Bring a child or a person who can t talk back if they seem to be behaving in an abnormal way.      MORE INFORMATION:    General Injuries:    Aches and pains are usually worse the day after your accident, but should not be severe, and should start getting better after that. Aches and pains are common in the neck and back.    Injuries from your accident may prevent you from working.  Follow-up with your regular doctor to get a work note and to find out how long you will not be able to work.    Pain medications or your injuries may make it unsafe for you to drive or operate machinery.    Use ice to injured areas for the first one or two days. Apply a bag of ice wrapped in a cloth for about 15 minutes at a time. You can do this as often as once an hour. Do not sleep with an ice pack, since it can burn you.     You can use non-prescription pain medicine, like Tylenol  (acetaminophen), Advil  (ibuprofen), Motrin  (ibuprofen), Nuprin  (ibuprofen) if your emergency doctor or your own doctor told you this is okay. Tylenol  (acetaminophen) is in  many prescription medicines and non-prescription medicines--check all of your medicines to be sure you aren t taking more than 3000 mg per day.    Limit your activity for at least one or two days. Avoid doing things that hurt.    You need to see your doctor if any injured area is not back to normal in 1 week.    Car Accident:    If you have been on a backboard or had a neck collar on, this may make you stiff and sore.  This should get better in 1-2 days.  Return to the Emergency Department if the pain or discomfort is severe or gets worse.    Be careful of shards of glass on your body or in your belongings.    Fractures, Sprains, and Strains:    Return to the Emergency Department right away if your injured area gets more painful, if the splint or dressing seems to be too tight, if it gets numb or tingly past the injury, or if the area past the injury gets pale, blue, or cold.    Use your crutches if you were given them today. Don t put weight on the injured area until the pain is gone.    Keep the injured area above the level of your heart while laying or sitting down.  This well help lessen the swelling (puffiness) and the pain.    You may use an elastic bandage (Ace  Wrap) if it makes you more comfortable. Wrap it just tight enough to provide mild compression, and loosen it if you get swelling past the bandage.    Note about X-rays: If you had x-rays done today, they were read by your emergency physician. They will also be read later by a radiologist. We will contact you if the radiologist thinks they show something different than the emergency physician did. Remember that there are some fractures (breaks in the bone) that can t be seen right away. Even if your x-rays today were normal, you must see your doctor in clinic to re-check.     Splints:    A splint put on in the Emergency Department is temporary. Your regular doctor or orthopedic doctor will remove it, and replace it with a cast or boot if  needed.    Keep the splint dry. Cover it with a plastic bag when you wash. Even with a plastic bag, you still can t get in water or let water get right on it. If it does get wet, you should come back or see your doctor to have it replaced.    Do not put objects inside the splint to scratch.    If there is an elastic bandage (Ace  Wrap) holding the splint on this may be loosened a little to relieve pressure or pain.  If pain continues return to the Emergency Department right away.    Return if the splint starts cutting into your skin.    Do not remove your splint by yourself unless told to by your doctor. You can t take it off and put it back on again.     Wounds:    Infections can follow many injuries. Watch for fevers, redness spreading from the wound, pus or stitches that open up. Return here or see your doctor if these happen.    There can always be glass, wood, dirt or other things in any wound.  They won t always show up even on x-rays.  If a wound doesn t heal, this may be why, and it is important to follow-up with your regular doctor. Small pieces of glass or other materials may work their way out on their own.    Cuts or scrapes may start to bleed after leaving the Emergency Department.  If this happens, hold pressure on the bleeding area with a clean cloth or put pressure over the bandage.  If the bleeding doesn t stop after you use constant pressure for   hour, you should return to the Emergency Department for further treatment.    Any bandage or dressing put on here should be removed in 12-24 hours, or as your doctor instructs. Remove the dressing sooner if it seems too tight or painful, or if it is getting numb, tingly, or pale past the dressing.    After you take off the dressing, wash the cut or scrape with soap and water once or twice a day.    Apply ointment like Bacitracin  (polypeptide antibiotic) to scrapes or cuts, and keep them covered with a Band-Aid  or gauze if possible, until they heal up or  until your stitches are taken out.    Dermabond  or Steri-Strips  should be left alone and will come off by themselves.  Dissolving stitches should go away or fall out within about a week.    Regular stitches need to be taken out by your doctor in clinic.  Call today and schedule an appointment.  Leave your stitches in for as long as you were told today.    Most injuries are preventable!  As your local emergency physicians, we encourage you to:    Wear your seat belt.    Do not talk on your cell phone while driving.    Do not read or send text messages while driving.    Wear a bike or motorcycle helmet.    Wear a helmet while skiing and snowboarding.    Wear personal flotation devices at all times while on the water.    Always have your child in a car seat.    Do not allow children less than 12 years old to ride in the front seat.    Go to the CDC website to find more information on preventing injures:  http://www.cdc.gov/injury/index.html    If you were given a prescription for medicine here today, be sure to read all of the information (including the package insert) that comes with your prescription.  This will include important information about the medicine, its side effects, and any warnings that you need to know about.  The pharmacist who fills the prescription can provide more information and answer questions you may have about the medicine.  If you have questions or concerns that the pharmacist cannot address, please call or return to the Emergency Department.     Opioid Medication Information    Pain medications are among the most commonly prescribed medicines, so we are including this information for all our patients. If you did not receive pain medication or get a prescription for pain medicine, you can ignore it.     You may have been given a prescription for an opioid (narcotic) pain medicine and/or have received a pain medicine while here in the Emergency Department. These medicines can make you drowsy  or impaired. You must not drive, operate dangerous equipment, or engage in any other dangerous activities while taking these medications. If you drive while taking these medications, you could be arrested for DUI, or driving under the influence. Do not drink any alcohol while you are taking these medications.     Opioid pain medications can cause addiction. If you have a history of chemical dependency of any type, you are at a higher risk of becoming addicted to pain medications.  Only take these prescribed medications to treat your pain when all other options have been tried. Take it for as short a time and as few doses as possible. Store your pain pills in a secure place, as they are frequently stolen and provide a dangerous opportunity for children or visitors in your house to start abusing these powerful medications. We will not replace any lost or stolen medicine.  As soon as your pain is better, you should flush all your remaining medication.     Many prescription pain medications contain Tylenol  (acetaminophen), including Vicodin , Tylenol #3 , Norco , Lortab , and Percocet .  You should not take any extra pills of Tylenol  if you are using these prescription medications or you can get very sick.  Do not ever take more than 3000 mg of acetaminophen in any 24 hour period.    All opioids tend to cause constipation. Drink plenty of water and eat foods that have a lot of fiber, such as fruits, vegetables, prune juice, apple juice and high fiber cereal.  Take a laxative if you don t move your bowels at least every other day. Miralax , Milk of Magnesia, Colace , or Senna  can be used to keep you regular.      Remember that you can always come back to the Emergency Department if you are not able to see your regular doctor in the amount of time listed above, if you get any new symptoms, or if there is anything that worries you.

## 2017-04-20 NOTE — ED NOTES
Patient had a slip and fall on Sunday night.  Was seen in the ED and sent home with pain and meds and antibiotics for an infected left thumb.  Seen again at [facility] urgent care on Tuesday and antibiotic was changed to Bactrim but pain and swelling continues and pain in shoulder in neck is not improving.      ABCs intact.  Alert and oriented x 3.

## 2017-04-23 ENCOUNTER — HOSPITAL ENCOUNTER (EMERGENCY)
Facility: CLINIC | Age: 32
Discharge: HOME | End: 2017-04-23
Attending: EMERGENCY MEDICINE | Admitting: EMERGENCY MEDICINE
Payer: COMMERCIAL

## 2017-04-23 VITALS
HEART RATE: 84 BPM | SYSTOLIC BLOOD PRESSURE: 151 MMHG | DIASTOLIC BLOOD PRESSURE: 95 MMHG | RESPIRATION RATE: 16 BRPM | TEMPERATURE: 97.7 F | OXYGEN SATURATION: 100 %

## 2017-04-23 DIAGNOSIS — M79.645 THUMB PAIN, LEFT: ICD-10-CM

## 2017-04-23 PROCEDURE — 99283 EMERGENCY DEPT VISIT LOW MDM: CPT | Mod: 25

## 2017-04-23 PROCEDURE — 10060 I&D ABSCESS SIMPLE/SINGLE: CPT

## 2017-04-23 RX ORDER — BUPIVACAINE HYDROCHLORIDE 5 MG/ML
INJECTION, SOLUTION PERINEURAL
Status: DISCONTINUED
Start: 2017-04-23 | End: 2017-04-23 | Stop reason: HOSPADM

## 2017-04-23 RX ORDER — GABAPENTIN 300 MG/1
300 CAPSULE ORAL 3 TIMES DAILY
Qty: 21 CAPSULE | Refills: 0 | Status: SHIPPED | OUTPATIENT
Start: 2017-04-23 | End: 2017-04-26

## 2017-04-23 RX ORDER — GINSENG 100 MG
CAPSULE ORAL
Status: DISCONTINUED
Start: 2017-04-23 | End: 2017-04-23 | Stop reason: HOSPADM

## 2017-04-23 RX ORDER — CIPROFLOXACIN 500 MG/1
500 TABLET, FILM COATED ORAL 2 TIMES DAILY
Qty: 10 TABLET | Refills: 0 | Status: SHIPPED | OUTPATIENT
Start: 2017-04-23 | End: 2017-04-26

## 2017-04-23 ASSESSMENT — ENCOUNTER SYMPTOMS
VOMITING: 0
NAUSEA: 1
FEVER: 0
JOINT SWELLING: 1

## 2017-04-23 NOTE — ED AVS SNAPSHOT
[facility] Emergency Department    [address] [place] [address]    Phone:  [phone]    Fax:  [phone]                                       Eladio Navarro   MRN: [id number]    Department:  [facility] Emergency Department   Date of Visit:  4/23/2017           Patient Information     Date Of Birth          1985        Your diagnoses for this visit were:     Thumb pain, left        You were seen by Frank Caballero MD.        Discharge Instructions         Please make an appointment to follow up with your primary care provider in 3-5 days even if entirely better.      Myalgias  Myalgias are another word for muscle aches and soreness. This is a symptom, not a disease. Myalgias can have many causes. A cold, the flu, or an acute infection can cause them. So can any illness with a high fever. They may happen after exertion (such as heavy exercise) or trauma (such as an accident or fall). Some medicines (such as statins and certain antidepressants) can cause myalgias. They can also be a symptom of chronic or ongoing medical problems (such as lupus, chronic fatigue, or hypothyroidism). With these illnesses, other serious symptoms often occur in addition to muscle pain and soreness.    Myalgias most often go away on their own. If they don't go away, come back, or are severe, testing may be needed to help find the cause.  Home care    Rest until you feel better.    Follow instructions that you were given for how to care for yourself. This may depend on the cause of your myalgias.     If myalgia is thought to be due to a medicine, be sure to talk to the doctor that prescribed the medicine about the best course of action.    To control pain, take prescription or over-the-counter medicines as directed. Unless told not to, you can try acetaminophen or ibuprophen.  Follow-up care  Follow up with your healthcare provider or as advised by our staff. If your  symptoms do not go away in a few days or if they come back, follow up with your healthcare provider for an exam and testing.  When to see medical advice  Call your healthcare provider for any of the following:    Fever of 10.4 F (38 C) or higher, or as directed by your healthcare provider    Pain that gets worse and not better, or that goes away and comes back    New joint pains    New rash    Severe headache, neck pain, drowsiness, or confusion    [id number] [employer]. [address] [place] [address]. All rights reserved. This information is not intended as a substitute for professional medical care. Always follow your healthcare professional's instructions.          Discharge References/Attachments     JOSE (ENGLISH)      24 Hour Appointment Hotline       To make an appointment at any [facility], call [phone] ([phone]). If you don't have a family doctor or clinic, we will help you find one. [facility] are conveniently located to serve the needs of you and your family.             Review of your medicines      START taking        Dose / Directions Last dose taken    ciprofloxacin 500 MG tablet   Commonly known as:  CIPRO   Dose:  500 mg   Quantity:  10 tablet        Take 1 tablet (500 mg) by mouth 2 times daily for 5 days   Refills:  0          Our records show that you are taking the medicines listed below. If these are incorrect, please call your family doctor or clinic.        Dose / Directions Last dose taken    gabapentin 300 MG capsule   Commonly known as:  NEURONTIN   Dose:  300 mg   Quantity:  21 capsule        Take 1 capsule (300 mg) by mouth 3 times daily   Refills:  0        HYDROcodone-acetaminophen 5-325 MG per tablet   Commonly known as:  NORCO   Dose:  1-2 tablet   Quantity:  15 tablet        Take 1-2 tablets by mouth every 6 hours as needed for moderate to severe pain   Refills:  0        oxyCODONE-acetaminophen 5-325 MG per tablet   Commonly  known as:  PERCOCET   Dose:  1-2 tablet   Quantity:  20 tablet        Take 1-2 tablets by mouth every 6 hours as needed for moderate to severe pain   Refills:  0        traMADol 50 MG tablet   Commonly known as:  ULTRAM   Dose:   mg   Quantity:  15 tablet        Take 1-2 tablets ( mg) by mouth every 6 hours as needed for pain   Refills:  0          ASK your doctor about these medications        Dose / Directions Last dose taken    cephALEXin 500 MG capsule   Commonly known as:  KEFLEX   Dose:  500 mg   Quantity:  21 capsule   Ask about: Should I take this medication?        Take 1 capsule (500 mg) by mouth 3 times daily for 7 days   Refills:  0                Prescriptions were sent or printed at these locations (2 Prescriptions)                   Other Prescriptions                Printed at Department/Unit printer (2 of 2)         ciprofloxacin (CIPRO) 500 MG tablet               gabapentin (NEURONTIN) 300 MG capsule                Orders Needing Specimen Collection     None      Pending Results     No orders found from 4/21/2017 to 4/24/2017.            Pending Culture Results     No orders found from 4/21/2017 to 4/24/2017.            Test Results From Your Hospital Stay               Clinical Quality Measure: Blood Pressure Screening     Your blood pressure was checked while you were in the emergency department today. The last reading we obtained was  BP: (!) 151/95 . Please read the guidelines below about what these numbers mean and what you should do about them.  If your systolic blood pressure (the top number) is less than 120 and your diastolic blood pressure (the bottom number) is less than 80, then your blood pressure is normal. There is nothing more that you need to do about it.  If your systolic blood pressure (the top number) is 120-139 or your diastolic blood pressure (the bottom number) is 80-89, your blood pressure may be higher than it should be. You should have your blood pressure  "rechecked within a year by a primary care provider.  If your systolic blood pressure (the top number) is 140 or greater or your diastolic blood pressure (the bottom number) is 90 or greater, you may have high blood pressure. High blood pressure is treatable, but if left untreated over time it can put you at risk for heart attack, stroke, or kidney failure. You should have your blood pressure rechecked by a primary care provider within the next 4 weeks.  If your provider in the emergency department today gave you specific instructions to follow-up with your doctor or provider even sooner than that, you should follow that instruction and not wait for up to 4 weeks for your follow-up visit.        Thank you for choosing [facility]       Thank you for choosing [facility] for your care. Our goal is always to provide you with excellent care. Hearing back from our patients is one way we can continue to improve our services. Please take a few minutes to complete the written survey that you may receive in the mail after you visit with us. Thank you!        [employer] Information     [employer] lets you send messages to your doctor, view your test results, renew your prescriptions, schedule appointments and more. To sign up, go to [email or web address] . Click on \"Log in\" on the left side of the screen, which will take you to the Welcome page. Then click on \"Sign up Now\" on the right side of the page.     You will be asked to enter the access code listed below, as well as some personal information. Please follow the directions to create your username and password.     Your access code is: [email or web address]  Expires: 2017  6:17 PM     Your access code will  in 90 days. If you need help or a new code, please call your [facility] clinic or [phone].        Care EveryWhere ID     This is your Care EveryWhere ID. This could be used by other organizations to access your [facility] medical records  [id number]        After Visit " Summary       This is your record. Keep this with you and show to your community pharmacist(s) and doctor(s) at your next visit.

## 2017-04-23 NOTE — DISCHARGE INSTRUCTIONS
Please make an appointment to follow up with your primary care provider in 3-5 days even if entirely better.      Myalgias  Myalgias are another word for muscle aches and soreness. This is a symptom, not a disease. Myalgias can have many causes. A cold, the flu, or an acute infection can cause them. So can any illness with a high fever. They may happen after exertion (such as heavy exercise) or trauma (such as an accident or fall). Some medicines (such as statins and certain antidepressants) can cause myalgias. They can also be a symptom of chronic or ongoing medical problems (such as lupus, chronic fatigue, or hypothyroidism). With these illnesses, other serious symptoms often occur in addition to muscle pain and soreness.    Myalgias most often go away on their own. If they don't go away, come back, or are severe, testing may be needed to help find the cause.  Home care    Rest until you feel better.    Follow instructions that you were given for how to care for yourself. This may depend on the cause of your myalgias.     If myalgia is thought to be due to a medicine, be sure to talk to the doctor that prescribed the medicine about the best course of action.    To control pain, take prescription or over-the-counter medicines as directed. Unless told not to, you can try acetaminophen or ibuprophen.  Follow-up care  Follow up with your healthcare provider or as advised by our staff. If your symptoms do not go away in a few days or if they come back, follow up with your healthcare provider for an exam and testing.  When to see medical advice  Call your healthcare provider for any of the following:    Fever of 10.4 F (38 C) or higher, or as directed by your healthcare provider    Pain that gets worse and not better, or that goes away and comes back    New joint pains    New rash    Severe headache, neck pain, drowsiness, or confusion    [id number] [employer]. [address] [place] [address], [place] [address].  All rights reserved. This information is not intended as a substitute for professional medical care. Always follow your healthcare professional's instructions.

## 2017-04-23 NOTE — ED PROVIDER NOTES
History     Chief Complaint:  Fall    HPI   Eladio Navarro is a 31 year old male with a history of chronic shoulder pain and cervical DDD who presents with concern for injuries sustained from a fall about one week ago. The patient describes slipping while walking to the hot tub, falling onto his left side, and injuring his left neck, shoulder, and thumb. He was first evaluated here in the ED on 4/15 and started on Keflex with concern for early infection of the left thumb. The patient later presented to [facility] for evaluation of persistent pain; he underwent x-rays that were reportedly negative and he was switched to Bactrim as his presumed thumb infection was not improving. The patient again presented to the ED on 4/20 (3 days ago) for evaluation of new redness and swelling to his left thumb. At that point, it was thought that the patient's thumb symptoms were traumatic rather than infective so he was started on Gabapentin. Since then, the patient has had persistent pain to the left thumb as well as the left neck and shoulder. He has also noticed increased swelling to the left thumb as well as some whitish discharge. He presents to the ED complaining of 8/10 pain. The patient is most concerned about the persistence of all of his pains. The patient endorses nausea but denies vomiting or fevers. He has not followed up with ortho surgery as recommended but has a PCP appointment scheduled for tomorrow.     Allergies:  The patient has no known drug allergies.     Medications:    Gabapentin  Bactrim  Norco  Percocet    Past Medical History:    Chronic shoulder pain  DDD, cervical     Past Surgical History:    Orthopedic surgery    Family History:    DM    Social History:  Relationship status: Single  Tobacco use: Negative  Alcohol use: Negative  The patient presents alone.     Review of Systems   Constitutional: Negative for fever.   Gastrointestinal: Positive for nausea. Negative for vomiting.   Musculoskeletal:  Positive for joint swelling.        Positive for neck, shoulder, and thumb pain.   All other systems reviewed and are negative.    Physical Exam   First Vitals:  BP: (!) 151/95  Pulse: 84  Temp: 97.7  F (36.5  C)  Resp: 16  SpO2: 100 %    Physical Exam    General:   Pleasant, age appropriate.   EYES:   Conjunctiva normal.  NECK:    Supple, no meningismus.      No bony tenderness     Full passive ROM  CV:     Regular rate and rhythm     No murmurs, rubs or gallops.       2+ radial pulses bilateral.  PULM:    Clear to auscultation bilateral.       No respiratory distress.      No wheezing, rales or stridor.  ABD:    Soft, non-tender, non-distended.  No pulsatile masses.       No rebound or guarding.  MSK:     Left shoulder:       No focal tenderness, full ROM     Left thumb: no rotational deformity.      FDS, FDP and extensor tendon intact.      Tenderness to proximal nail bed at medial border with minimal erythema and superficial skin breakdown.       No purulent discharge      No fluctuance to distal finger pad at palmar surface      No subungual hematoma or nail injury.  LYMPH:   No cervical lymphadenopathy.  NEURO:   Alert; good muscle tone  SKIN:    Warm, dry and intact.       No rash.  PSYCH:    Mood is good and affect is appropriate.      Emergency Department Course   Procedures:    Procedure: Incision and Drainage     LOCATIONS:  Left thumb    ANESTHESIA:  Local field block using Marcaine 0.5% plain.    PREPARATION:  Cleansed with Betadine    PROCEDURE:  Area was incised with # 11 Blade (Sharp Point) with a Single Straight incision.  Wound treatment included no significant purulent discharge.  Packing consisted of No Packing.  Appropriate dressing was applied to cover the area.    Patient Status: Patient tolerated the procedure well. There were no complications.         Emergency Department Course:  Nursing notes and vitals reviewed.  I performed an exam of the patient as documented above.  1131: I numbed the  patient's thumb.   1141: I performed I&D as documented above.   Findings and plan explained to the Patient. Patient discharged home, status improved, with instructions regarding supportive care, medications, and reasons to return as well as the importance of close follow-up was reviewed.     Impression & Plan    Medical Decision Making:  Eladio Navarro is a 31 year old male who presents to the ED with primary complaints of persistent left thumb pain after a traumatic injury and concerns of associated nailbed infection. On examination, it is uncertain whether his injury is related to blunt traumatic force versus early infection. He has already been on a number of antibiotics with Keflex and Bactrim. Out of concern for possible paronychia, patient underwent digital block followed by incision and drainage. There was no significant purulent material retained. Although he has had appropriate Staph and Strep coverage, this was a hot tub injury drawing concern for possible exposure to Pseudomonas. Patient will be placed on a 5-day course of Ciprofloxacin, although the likelihood of true infectious etiology is low.     The patient has also had persistent acute on chronic left neck and shoulder pain. He has no features concerning for fracture or dislocation. This appears to be an exacerbation of chronic process. Recommend following with primary care provider for further care and treatment.     Diagnosis:    ICD-10-CM   1. Thumb pain, left M79.645     Disposition:  Discharge to home with primary care follow up.     Discharge Medications:  1. Cipro, 500 mg, PO BID for 5 days  2. Gabapentin, 300 mg, PO TID       Eufemia TRUJILLO, carlie serving as a scribe on 4/23/2017 at 11:25 AM to personally document services performed by Frank Caballero MD, based on my observations and the provider's statements to me.    [facility] EMERGENCY DEPARTMENT     Frank Caballero MD  04/23/17 3608

## 2017-04-23 NOTE — ED AVS SNAPSHOT
[facility] Emergency Department    [address]    [facility] [address]    Phone:  [phone]    Fax:  [phone]                                       Eladio Navarro   MRN: [id number]    Department:  [facility] Emergency Department   Date of Visit:  4/23/2017           After Visit Summary Signature Page     I have received my discharge instructions, and my questions have been answered. I have discussed any challenges I see with this plan with the nurse or doctor.    ..........................................................................................................................................  Patient/Patient Representative Signature      ..........................................................................................................................................  Patient Representative Print Name and Relationship to Patient    ..................................................               ................................................  Date                                            Time    ..........................................................................................................................................  Reviewed by Signature/Title    ...................................................              ..............................................  Date                                                            Time

## 2017-04-23 NOTE — ED NOTES
Pt slipped and fell on 4/15. Patient was evaluated after the fall. He continues to have pain in his left thumb, left shoulder, and left hip. ABCDs intact.

## 2017-04-26 ENCOUNTER — HOSPITAL ENCOUNTER (EMERGENCY)
Facility: CLINIC | Age: 32
Discharge: LEFT BEFORE DISCHARGE | End: 2017-04-26
Attending: EMERGENCY MEDICINE | Admitting: EMERGENCY MEDICINE
Payer: COMMERCIAL

## 2017-04-26 VITALS
WEIGHT: 190 LBS | DIASTOLIC BLOOD PRESSURE: 83 MMHG | BODY MASS INDEX: 27.2 KG/M2 | SYSTOLIC BLOOD PRESSURE: 125 MMHG | RESPIRATION RATE: 18 BRPM | HEART RATE: 76 BPM | TEMPERATURE: 99.8 F | OXYGEN SATURATION: 99 % | HEIGHT: 70 IN

## 2017-04-26 DIAGNOSIS — S20.212A CONTUSION OF LEFT CHEST WALL, INITIAL ENCOUNTER: ICD-10-CM

## 2017-04-26 DIAGNOSIS — G44.329 CHRONIC POST-TRAUMATIC HEADACHE, NOT INTRACTABLE: ICD-10-CM
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
## 2017-04-26 PROCEDURE — 99282 EMERGENCY DEPT VISIT SF MDM: CPT

## 2017-04-26 ASSESSMENT — ENCOUNTER SYMPTOMS
NECK PAIN: 1
BACK PAIN: 1
NAUSEA: 1

## 2017-04-26 NOTE — ED PROVIDER NOTES
"  History     Chief Complaint:  Fall    HPI   Eladio Navarro is a 31 year old male with a history of chronic shoulder pain who presents for evaluation after fall. He was seen in the ED on 4/23 for follow up after a fall and concern for left thumb infection for which he had finished Keflex and Bactrim. He was discharged with Cipro and Gabapentin. He has been seen here for falls multiple times and in mid February was prescribed Flexeril and Percocet and on 4/15 Norco. He presents today stating his left leg gave out while he was going down a flight of stairs backwards helping move a desk. He fell onto his left side and complains of left shoulder and rib pain, mid back pain, and neck pain with nausea. He also reports having migraine tension headaches recently. The patient states he is unable to get in to see his doctor until next Monday and that Gabapentin is not working for him. He has been using Tylenol and Ibuprofen but states these are also not providing enough relief.     Allergies:  No known drug allergies.      Medications:    The patient is not currently taking any prescribed medications.     Past Medical History:    Chronic shoulder pain  Degenerative disc disease, cervical     Past Surgical History:    Orthopedic surgery     Family History:    History reviewed. No pertinent family history.    Social History:  Marital Status: single  Presents to the ED alone  Tobacco Use: No  Alcohol Use: No    Review of Systems   Gastrointestinal: Positive for nausea.   Musculoskeletal: Positive for back pain and neck pain.        +L shoulder pain   All other systems reviewed and are negative.      Physical Exam   First Vitals:  BP: 125/83  Pulse: 76  Temp: 99.8  F (37.7  C)  Resp: 18  Height: 177.8 cm (5' 10\")  Weight: 86.2 kg (190 lb)  SpO2: 99 %      Physical Exam  Nursing note and vitals reviewed.  Constitutional: Cooperative. Resting comfortably.  HENT:   Mouth/Throat: Mucous membranes are normal.  Freely moving " neck.   Cardiovascular: Normal rate, regular rhythm and normal heart sounds.  No murmur.  Pulmonary/Chest: Effort normal and breath sounds normal. No respiratory distress. No wheezes. No rales.   Abdominal: Soft. Normal appearance. No distension. There is no tenderness. There is no rigidity and no guarding.   Musculoskeletal: Normal range of motion of extremities. Tenderness in the left lateral ribs. Full range of motion of the neck. Tenderness in the left trapezius musculature. No midline cervical or thoracic tenderness.   Neurological: Alert. GCS 15. Strength and sensation in UE's is normal.   Skin: Skin is warm and dry. No rash noted. Left thumb changes consistent with a healing paronychia. Mild swelling but no fluctuance, warmth, or erythema.   Psychiatric: Normal mood and affect.      Emergency Department Course     Emergency Department Course:   Nursing notes and vitals reviewed.  I performed an exam of the patient as documented above.  Left ribs xray was ordered.  Patient eloped prior to xray.     Impression & Plan      Medical Decision Making:  Eladio Navarro is a 31 year old gentleman who has been seen multiple times this year for varying complaints related to a thumb injury, shoulder injury, and subsequent falls. He main complaint today is about left rib pain. He did inquire about additional pain medication. It looks like he has received Gabapentin from us on several occasions in addition to Vicodin. He has an appointment coming up and I did explain to him we would likely have him continue to deal with what is becoming a chronic pain issue through his regular physician. I put in for rib xrays as he stated that was his biggest concern but prior to xrays without notifying staff he left the emergency department. His vitals were reassuring. I have low suspicion clinically for rib fracture or pneumothorax. There was no indication for head imaging based on his story without loss of consciousness and by  following the [place] head CT rules. Unfortunately unable to discuss much of this with the patient since he left prior to his xray or prior to be reevaluated.     Diagnosis:    ICD-10-CM    1. Fall on steps, initial encounter W10.8XXA    2. Contusion of left chest wall, initial encounter S20.212A    3. Chronic post-traumatic headache, not intractable G44.329        Disposition:  Patient eloped from the emergency department.     Brissa Hernandez  4/26/2017   [facility] EMERGENCY DEPARTMENT    I, Brissa Hernandez, am serving as a scribe on 4/26/2017 at 3:48 PM to personally document services performed by Dr. Hensley based on my observations and the provider's statements to me.       Oniel Hensley MD  04/26/17 1716

## 2017-04-26 NOTE — DISCHARGE INSTRUCTIONS
Chest Contusion    A contusion is a bruise to the skin, muscle, or ribs. It may cause pain, tenderness, and swelling. It may turn the skin purple until it heals. Contusions take a few days to a few weeks to heal.  Home care  Follow these guidelines when caring for yourself at home:    Rest. Don t do any heavy lifting or strenuous activity. Don t do any activity that causes pain.    Put an ice pack on the injured area. Do this for 20 minutes every 1 to 2 hours the first day. You can make an ice pack by wrapping a plastic bag of ice cubes in a thin towel. Continue to use the ice pack 3 to 4 times a day for the next 2 days. Then use the ice pack as needed to ease pain and swelling.    After 1 to 2 days you may put a warm compress on the area. Do this for 10 minutes several times a day. A warm compress is a clean cloth that s damp with warm water.    Hold a pillow to the affected area when you cough. This will help ease pain.    You may use acetaminophen or ibuprofen to control pain, unless another pain medicine was prescribed. If you have chronic liver or kidney disease, talk with your health care provider before using these medicines. Also talk with your provider if you ve had a stomach ulcer or GI bleeding.  Follow-up care  Follow up with your health care provider during the next week, or as advised.  When to seek medical advice  Call your health care provider right away if any of these occur:    Shortness of breath, difficulty breathing, or breathing fast    Chest pain gets worse when you breathe    Severe pain that comes on suddenly or lasts more than an hour    Dizziness, weakness, or fainting    New abdominal pain or abdominal pain that gets worse     Fever of 101 F (38.3 C) or higher, or as directed by your health care provider    4240-1808 [employer]. [address] [place] [address], [place] [address]. All rights reserved. This information is not intended as a substitute for professional medical care.  Always follow your healthcare professional's instructions.

## 2017-06-07 ENCOUNTER — HOSPITAL ENCOUNTER (EMERGENCY)
Facility: CLINIC | Age: 32
Discharge: HOME | End: 2017-06-07
Attending: EMERGENCY MEDICINE | Admitting: EMERGENCY MEDICINE
Payer: COMMERCIAL

## 2017-06-07 VITALS
DIASTOLIC BLOOD PRESSURE: 83 MMHG | TEMPERATURE: 98.3 F | HEART RATE: 73 BPM | SYSTOLIC BLOOD PRESSURE: 132 MMHG | RESPIRATION RATE: 16 BRPM | OXYGEN SATURATION: 98 %

## 2017-06-07 DIAGNOSIS — G89.4 CHRONIC PAIN SYNDROME: Chronic | ICD-10-CM

## 2017-06-07 DIAGNOSIS — S49.91XA INJURY OF SHOULDER, RIGHT, INITIAL ENCOUNTER: ICD-10-CM

## 2017-06-07 DIAGNOSIS — S46.811A TRAPEZIUS STRAIN, RIGHT, INITIAL ENCOUNTER: ICD-10-CM

## 2017-06-07 PROCEDURE — 25000132 ZZH RX MED GY IP 250 OP 250 PS 637: Performed by: EMERGENCY MEDICINE

## 2017-06-07 PROCEDURE — 99283 EMERGENCY DEPT VISIT LOW MDM: CPT

## 2017-06-07 RX ORDER — HYDROCODONE BITARTRATE AND ACETAMINOPHEN 5; 325 MG/1; MG/1
2 TABLET ORAL ONCE
Status: COMPLETED | OUTPATIENT
Start: 2017-06-07 | End: 2017-06-07

## 2017-06-07 RX ORDER — IBUPROFEN 800 MG/1
800 TABLET, FILM COATED ORAL EVERY 8 HOURS PRN
Qty: 60 TABLET | Refills: 0 | Status: SHIPPED | OUTPATIENT
Start: 2017-06-07 | End: 2017-06-15

## 2017-06-07 RX ADMIN — HYDROCODONE BITARTRATE AND ACETAMINOPHEN 2 TABLET: 5; 325 TABLET ORAL at 22:46

## 2017-06-07 NOTE — ED AVS SNAPSHOT
[facility] Emergency Department    [address]    [facility] [address]    Phone:  [phone]    Fax:  [phone]                                       Eladio Navarro   MRN: [id number]    Department:  [facility] Emergency Department   Date of Visit:  6/7/2017           Patient Information     Date Of Birth          1985        Your diagnoses for this visit were:     Injury of shoulder, right, initial encounter     Trapezius strain, right, initial encounter        You were seen by Oniel Reyes MD.      Follow-up Information     Follow up with [facility] In 3 days.    Specialties:  Sports Medicine, Pain Management, Obstetrics & Gynecology, Pediatrics, Internal Medicine, Nephrology    Contact information:    [address] [place] [address]  [facility] [address] [phone]        Discharge Instructions         Self-Care for Strains and Sprains  Most minor strains and sprains can be treated with self-care. Recovering from a strain or sprain may take 6 to 8 weeks. Your self-care goal is to reduce pain and immobilize the injury to speed healing.     A sprain injures ligaments (tissue that connects bones to bones).        A strain injures muscles or tendons (tissue that connects muscles to bones).   Support the injured area  Wrapping the injured area provides support for short, necessary activities. Be careful not to wrap the area too tightly. This could cut off the blood supply.    Support a wrist, elbow, or shoulder with a sling.    Wrap an ankle or knee with an elastic bandage.    Tape a finger or toe to the one next to it.  Use cold and heat  Cold reduces swelling. Both cold and heat reduce pain. Heat should not be used in the initial treatment of the injury. When using cold or heat, always place a towel between the pack and your skin.    Apply ice or a cold pack 10 to 15 minutes every hour you re awake for the first 2  days.    After the swelling goes down, use cold or heat to control pain. Don t use heat late in the day, since it can cause swelling when you re not active.  Rest and elevate  Rest and elevation help your injury heal faster.    Raise the injured area above your heart level.    Keep the injured area from moving.    Limit the use of the joint or limb.  Use medicine    Aspirin reduces pain and swelling. (Note: Don t give aspirin to a child 18 or younger unless prescribed by the doctor.)    Aspirin substitutes, such as ibuprofen, can reduce pain. Some substitutes reduce swelling, too. Ask your pharmacist which substitutes you can use.  Call your doctor if:    The injured joint won t move, or bones make a grating sound when they move.    You can t put weight on the injured area, even after 24 hours.    The injured body part is cold, blue, or numb.    The joint or limb appears bent or crooked.    Pain increases or doesn t improve in 4 days.    When pressing along the injured area, you notice a spot that is especially painful.     0346-6822 [employer]. [address] [place] [address]. All rights reserved. This information is not intended as a substitute for professional medical care. Always follow your healthcare professional's instructions.      Discharge Instructions  Chronic Pain  You were seen today for an issue regarding chronic or recurrent pain. You may have a condition that gives you pain every day, or a condition that causes pain that keeps coming back, or several conditions involving pain.   Many patients with chronic or recurrent pain come to the Emergency Department thinking that a shot of narcotic pain medicine or a prescription for pain pills to take home is the best answer to their problem. We have discovered, though, that these approaches put our patients at high risk of long-term problems. This sort of treatment may actually make your pain worse, make it harder to control, and put  you at an unacceptable risk of complications.   Because of the risks, we are very hesitant to treat your type of pain with short-acting or potentially habit-forming medications. These medications represent a significant risk to your health, and need to be managed by a physician who can follow your care consistently.  We have established a policy for the treatment of patients with chronic or recurrent pain that does not allow for treatment with injection narcotics or take-home prescriptions for narcotics.  We will treat your symptoms with non-narcotic medications and other treatments, and we will make referrals to pain specialists or other specialized providers if we think such referrals would benefit you.  You should expect to receive treatment consistent with this policy during future visits.    If you have concerns about our policy, please discuss them with your primary care provider or pain specialist, who can contact us if necessary for further information or clarification.  If you were given a prescription for medicine here today, be sure to read all of the information (including the package insert) that comes with your prescription.  This will include important information about the medicine, its side effects, and any warnings that you need to know about.  The pharmacist who fills the prescription can provide more information and answer questions you may have about the medicine.  If you have questions or concerns that the pharmacist cannot address, please call or return to the Emergency Department.   Remember that you can always come back to the Emergency Department if you develop any new symptoms or if there is anything that worries you.        24 Hour Appointment Hotline       To make an appointment at any [facility], call [phone] ([phone]). If you don't have a family doctor or clinic, we will help you find one. [facility] are conveniently located to serve the needs of you and your  family.             Review of your medicines      Notice     You have not been prescribed any medications.            Orders Needing Specimen Collection     None      Pending Results     No orders found from 6/5/2017 to 6/8/2017.            Pending Culture Results     No orders found from 6/5/2017 to 6/8/2017.            Pending Results Instructions     If you had any lab results that were not finalized at the time of your Discharge, you can call the ED Lab Result RN at [phone]. You will be contacted by this team for any positive Lab results or changes in treatment. The nurses are available 7 days a week from 10A to 6:30P.  You can leave a message 24 hours per day and they will return your call.        Test Results From Your Hospital Stay               Clinical Quality Measure: Blood Pressure Screening     Your blood pressure was checked while you were in the emergency department today. The last reading we obtained was  BP: 130/89 . Please read the guidelines below about what these numbers mean and what you should do about them.  If your systolic blood pressure (the top number) is less than 120 and your diastolic blood pressure (the bottom number) is less than 80, then your blood pressure is normal. There is nothing more that you need to do about it.  If your systolic blood pressure (the top number) is 120-139 or your diastolic blood pressure (the bottom number) is 80-89, your blood pressure may be higher than it should be. You should have your blood pressure rechecked within a year by a primary care provider.  If your systolic blood pressure (the top number) is 140 or greater or your diastolic blood pressure (the bottom number) is 90 or greater, you may have high blood pressure. High blood pressure is treatable, but if left untreated over time it can put you at risk for heart attack, stroke, or kidney failure. You should have your blood pressure rechecked by a primary care provider within the next 4 weeks.  If  "your provider in the emergency department today gave you specific instructions to follow-up with your doctor or provider even sooner than that, you should follow that instruction and not wait for up to 4 weeks for your follow-up visit.        Thank you for choosing [facility]       Thank you for choosing [facility] for your care. Our goal is always to provide you with excellent care. Hearing back from our patients is one way we can continue to improve our services. Please take a few minutes to complete the written survey that you may receive in the mail after you visit with us. Thank you!        [employer] Information     [employer] lets you send messages to your doctor, view your test results, renew your prescriptions, schedule appointments and more. To sign up, go to [email or web address] . Click on \"Log in\" on the left side of the screen, which will take you to the Welcome page. Then click on \"Sign up Now\" on the right side of the page.     You will be asked to enter the access code listed below, as well as some personal information. Please follow the directions to create your username and password.     Your access code is: [email or web address]  Expires: 2017  6:17 PM     Your access code will  in 90 days. If you need help or a new code, please call your [facility] clinic or [phone].        Care EveryWhere ID     This is your Care EveryWhere ID. This could be used by other organizations to access your [facility] medical records  [id number]        After Visit Summary       This is your record. Keep this with you and show to your community pharmacist(s) and doctor(s) at your next visit.                  "

## 2017-06-07 NOTE — ED AVS SNAPSHOT
[facility] Emergency Department    [address]    [facility] [address]    Phone:  [phone]    Fax:  [phone]                                       Eladio Navarro   MRN: [id number]    Department:  [facility] Emergency Department   Date of Visit:  6/7/2017           After Visit Summary Signature Page     I have received my discharge instructions, and my questions have been answered. I have discussed any challenges I see with this plan with the nurse or doctor.    ..........................................................................................................................................  Patient/Patient Representative Signature      ..........................................................................................................................................  Patient Representative Print Name and Relationship to Patient    ..................................................               ................................................  Date                                            Time    ..........................................................................................................................................  Reviewed by Signature/Title    ...................................................              ..............................................  Date                                                            Time

## 2017-06-08 ENCOUNTER — HOSPITAL ENCOUNTER (EMERGENCY)
Facility: CLINIC | Age: 32
Discharge: HOME | End: 2017-06-08
Attending: EMERGENCY MEDICINE | Admitting: EMERGENCY MEDICINE
Payer: COMMERCIAL

## 2017-06-08 VITALS
RESPIRATION RATE: 17 BRPM | WEIGHT: 190 LBS | HEIGHT: 70 IN | SYSTOLIC BLOOD PRESSURE: 141 MMHG | TEMPERATURE: 98.8 F | OXYGEN SATURATION: 96 % | DIASTOLIC BLOOD PRESSURE: 74 MMHG | HEART RATE: 77 BPM | BODY MASS INDEX: 27.2 KG/M2

## 2017-06-08 DIAGNOSIS — G89.29 OTHER CHRONIC PAIN: ICD-10-CM

## 2017-06-08 DIAGNOSIS — M54.2 CERVICAL PAIN: ICD-10-CM

## 2017-06-08 PROBLEM — Z76.0 ISSUE OF REPEAT PRESCRIPTION: Status: ACTIVE | Noted: 2017-06-08

## 2017-06-08 PROBLEM — G89.4 CHRONIC PAIN SYNDROME: Chronic | Status: ACTIVE | Noted: 2017-06-08

## 2017-06-08 PROBLEM — Z76.0 ISSUE OF REPEAT PRESCRIPTION: Chronic | Status: ACTIVE | Noted: 2017-06-08

## 2017-06-08 PROCEDURE — 25000132 ZZH RX MED GY IP 250 OP 250 PS 637: Performed by: EMERGENCY MEDICINE

## 2017-06-08 PROCEDURE — 99283 EMERGENCY DEPT VISIT LOW MDM: CPT

## 2017-06-08 RX ORDER — LIDOCAINE 50 MG/G
1 PATCH TOPICAL EVERY 24 HOURS
Qty: 10 PATCH | Refills: 0 | Status: SHIPPED | OUTPATIENT
Start: 2017-06-08 | End: 2017-06-18

## 2017-06-08 RX ORDER — GABAPENTIN 300 MG/1
CAPSULE ORAL
Qty: 90 CAPSULE | Refills: 0 | Status: SHIPPED | OUTPATIENT
Start: 2017-06-08 | End: 2017-06-14

## 2017-06-08 RX ORDER — METHYLPREDNISOLONE 4 MG
TABLET, DOSE PACK ORAL
Qty: 21 TABLET | Refills: 0 | Status: SHIPPED | OUTPATIENT
Start: 2017-06-08

## 2017-06-08 RX ORDER — CYCLOBENZAPRINE HCL 10 MG
10 TABLET ORAL ONCE
Status: COMPLETED | OUTPATIENT
Start: 2017-06-08 | End: 2017-06-08

## 2017-06-08 RX ORDER — CYCLOBENZAPRINE HCL 10 MG
10 TABLET ORAL 3 TIMES DAILY PRN
Qty: 20 TABLET | Refills: 0 | Status: SHIPPED | OUTPATIENT
Start: 2017-06-08 | End: 2017-06-14

## 2017-06-08 RX ADMIN — CYCLOBENZAPRINE HYDROCHLORIDE 10 MG: 10 TABLET, FILM COATED ORAL at 20:37

## 2017-06-08 ASSESSMENT — ENCOUNTER SYMPTOMS
COUGH: 0
DIARRHEA: 0
WEAKNESS: 0
ARTHRALGIAS: 1
NAUSEA: 0
NECK PAIN: 1
VOMITING: 0
BACK PAIN: 0
NECK PAIN: 1
CHILLS: 0
FEVER: 0
ABDOMINAL PAIN: 0
SHORTNESS OF BREATH: 0
NUMBNESS: 0

## 2017-06-08 NOTE — ED PROVIDER NOTES
"  History     Chief Complaint:  Arm Injury    HPI   Eladio Navarro is a 31 year old male with history of chronic/recurrent pain and previously multiple prescriptions from the ED for various injuries and musculoskeletal pains, who presents to the emergency department today for evaluation of an arm/hand injury. The patient reports that on Monday 6/5/2017 he was working with a heavy table saw with his right hand underneath when it fell and \"pulled\" his arm out and downward. He was seen that night at [address] in [facility] where an x-ray was taken of his right hand and arm which was negative per his report. Today, the patient states that he has pain radiating into his right shoulder and neck along with a \"tingling\" feeling and believes he may need further evaluation. He made an appointment with his primary physician for next week, but does not believe that he can tolerate the pain until then. The patient does admit that he has a chronic history of left sided shoulder and neck pain, but feels that his right shoulder and neck have not been in pain like this since the accident. He denies any numbness or weakness to his upper extremities, or other symptoms at this time.     Allergies:  No Known Drug Allergie    Medications:    The patient is currently on no regular medications.      Past Medical History:    Chronic shoulder pain, right  Degenerative disc disease, cervical    Past Surgical History:    Orthopedic surgery    Family History:    Maternal Grandmother - Diabetes    Social History:  The patient was accompanied to the ED by his sister.  Smoking Status: Current Every Day Smoker  Alcohol Use: Negative  Marital Status:  Single [1]     Review of Systems   Musculoskeletal: Positive for neck pain (Right side).        Right shoulder, arm, and hand pain   Neurological: Negative for weakness and numbness.   All other systems reviewed and are negative.    Physical Exam   Vitals:  Patient Vitals for " the past 24 hrs:   BP Temp Temp src Pulse Resp SpO2   06/07/17 2300 132/83 - - - - -   06/07/17 2245 136/82 - - - - 98 %   06/07/17 2230 136/80 - - - - -   06/07/17 2222 130/89 98.3  F (36.8  C) Temporal 73 16 97 %     Physical Exam  Constitutional:  Alert, attentive, GCS 15  HENT:    Nose: Nose normal.    Mouth/Throat: Oropharynx is clear, mucous membranes are moist  Eyes:    EOM are normal. Pupils are equal, round, and reactive to light.   CV:    regular rate and rhythm; no murmurs, rubs or gallups. 2+ radial and ulnar pulses to the bilateral upper extremities   Chest:   Effort normal and breath sounds normal.   GI:     There is no tenderness. No distension. Normal bowel sounds  Neurological:  5/5 strength to the radian, ulnar and median motor distributions;      sensation intact to light touch to the radian, ulnar and median distributions  MSK:   Normal inspection and palpation to the cervical spine, right trapezius muscle, and shoulder, although he does note tenderness to palpation of the right superior trapezius and AC joint; normal ROM to the shoulder;      No tenderness or stepoffs to the C/T/L-spine     Normal, atraumatic inspection to the back    Skin:    Skin is warm and dry.     Emergency Department Course     Interventions:  2246 Norco 2 tablets PO    Emergency Department Course:  Nursing notes and vitals reviewed.  I performed an exam of the patient as documented above.   I discussed the treatment plan with the patient. He expressed understanding of this plan and consented to discharge. He will be discharged home with instructions for care and follow up. In addition, the patient will return to the emergency department if their symptoms persist, worsen, if new symptoms arise or if there is any concern.  All questions were answered.    Impression & Plan      Medical Decision Making:  Eladio Navarro is a 31 year old male presents for evaluation after reported strain type injury to the right shoulder  "and trapezius. Of note, the patient has had multiple previous visits for various MSK injuries and received multiple prescriptions in the past. Also of note, he declines an XR, focusing the conversation on obtaining a prescription for \"something strong\" and obtaining an MRI. I explained our chronic pain guideline, including its underpinnings and goals for optimal management of chronic pain, and that I would be unable to provide IV or prescription narcotics in this visit. He did receive norco #2 and has a ride home. The patient demonstrated understanding of this policy and my explanation, and I provided chronic pain resources. Overall, signs and symptoms are consistent with a trapezius and shoulder strain. There are no symptoms of cervical spine injury or central cord syndrome. He is aware we will be unable to rule out severe AC injury or fracture without an x-ray. He declines a sling, noting he has one at home. Supportive outpatient management is indicated.  Rest, ice, and elevation treatment was discussed with the patient. I recommended primary care follow up in 2-3 days for recheck; I advised strict return precautions for pain, weakness, or any other concerning symptoms.      Diagnosis:    ICD-10-CM    1. Injury of shoulder, right, initial encounter S49.91XA    2. Trapezius strain, right, initial encounter S46.811A    3. Chronic pain syndrome G89.4      Disposition:  Home in improved condition      Oniel Reyes MD  [facility]      Scribe Disclosure:  I, Gerardo Brambila, am serving as a scribe at 1:55 AM on 6/8/2017 to document services personally performed by Oniel Reyes MD, based on my observations and the provider's statements to me.    6/7/2017   [facility] EMERGENCY DEPARTMENT       Oniel Reyes MD  06/08/17 0303    "

## 2017-06-08 NOTE — DISCHARGE INSTRUCTIONS
Self-Care for Strains and Sprains  Most minor strains and sprains can be treated with self-care. Recovering from a strain or sprain may take 6 to 8 weeks. Your self-care goal is to reduce pain and immobilize the injury to speed healing.     A sprain injures ligaments (tissue that connects bones to bones).        A strain injures muscles or tendons (tissue that connects muscles to bones).   Support the injured area  Wrapping the injured area provides support for short, necessary activities. Be careful not to wrap the area too tightly. This could cut off the blood supply.    Support a wrist, elbow, or shoulder with a sling.    Wrap an ankle or knee with an elastic bandage.    Tape a finger or toe to the one next to it.  Use cold and heat  Cold reduces swelling. Both cold and heat reduce pain. Heat should not be used in the initial treatment of the injury. When using cold or heat, always place a towel between the pack and your skin.    Apply ice or a cold pack 10 to 15 minutes every hour you re awake for the first 2 days.    After the swelling goes down, use cold or heat to control pain. Don t use heat late in the day, since it can cause swelling when you re not active.  Rest and elevate  Rest and elevation help your injury heal faster.    Raise the injured area above your heart level.    Keep the injured area from moving.    Limit the use of the joint or limb.  Use medicine    Aspirin reduces pain and swelling. (Note: Don t give aspirin to a child 18 or younger unless prescribed by the doctor.)    Aspirin substitutes, such as ibuprofen, can reduce pain. Some substitutes reduce swelling, too. Ask your pharmacist which substitutes you can use.  Call your doctor if:    The injured joint won t move, or bones make a grating sound when they move.    You can t put weight on the injured area, even after 24 hours.    The injured body part is cold, blue, or numb.    The joint or limb appears bent or crooked.    Pain increases  or doesn t improve in 4 days.    When pressing along the injured area, you notice a spot that is especially painful.     9213-6786 [employer]. [address] [place] [address], [place] [address]. All rights reserved. This information is not intended as a substitute for professional medical care. Always follow your healthcare professional's instructions.      Discharge Instructions  Chronic Pain  You were seen today for an issue regarding chronic or recurrent pain. You may have a condition that gives you pain every day, or a condition that causes pain that keeps coming back, or several conditions involving pain.   Many patients with chronic or recurrent pain come to the Emergency Department thinking that a shot of narcotic pain medicine or a prescription for pain pills to take home is the best answer to their problem. We have discovered, though, that these approaches put our patients at high risk of long-term problems. This sort of treatment may actually make your pain worse, make it harder to control, and put you at an unacceptable risk of complications.   Because of the risks, we are very hesitant to treat your type of pain with short-acting or potentially habit-forming medications. These medications represent a significant risk to your health, and need to be managed by a physician who can follow your care consistently.  We have established a policy for the treatment of patients with chronic or recurrent pain that does not allow for treatment with injection narcotics or take-home prescriptions for narcotics.  We will treat your symptoms with non-narcotic medications and other treatments, and we will make referrals to pain specialists or other specialized providers if we think such referrals would benefit you.  You should expect to receive treatment consistent with this policy during future visits.    If you have concerns about our policy, please discuss them with your primary care provider or pain specialist,  who can contact us if necessary for further information or clarification.  If you were given a prescription for medicine here today, be sure to read all of the information (including the package insert) that comes with your prescription.  This will include important information about the medicine, its side effects, and any warnings that you need to know about.  The pharmacist who fills the prescription can provide more information and answer questions you may have about the medicine.  If you have questions or concerns that the pharmacist cannot address, please call or return to the Emergency Department.   Remember that you can always come back to the Emergency Department if you develop any new symptoms or if there is anything that worries you.

## 2017-06-08 NOTE — ED AVS SNAPSHOT
[facility] Emergency Department    [address] [place] [address]    Phone:  [phone]    Fax:  [phone]                                       Eladio Navarro   MRN: [id number]    Department:  [facility] Emergency Department   Date of Visit:  6/8/2017           Patient Information     Date Of Birth          1985        Your diagnoses for this visit were:     Cervical pain     Other chronic pain        You were seen by Gerald Johnson MD.        Discharge Instructions       Please make an appointment to follow up with [facility] [phone] in 5-7 days .        Chronic Pain  Pain serves an important role. It lets you know something is wrong that needs your attention. When the body heals, pain normally goes away.  When pain lasts longer than six months, it is called  chronic  pain. This is pain that is present even after the body has healed. Chronic pain can cause mood problems and get in the way of your relationships and your daily life.  A number of conditions can cause chronic pain. Some of the more common include:    Previous surgery    An old injury    Infection    Diseases such as diabetes    Nerve damage    Back injury    Arthritis    Migraine or other headaches    Fibromyalgia    Cancer  Depression and stress can make chronic pain symptoms worse. In some cases, a cause for the pain cannot be found.   Treatment  Treatment can greatly reduce pain. In many cases, pain can become less severe, occur less often, and interfere less with your daily life. Chronic pain is often treated with a combination of medicines, therapies, and lifestyle changes. You will work closely with your healthcare provider to find a treatment plan that works best for you.    Ask your healthcare provider for a referral to a pain management specialty center. These can provide the most recent and proven pain management strategies, along with emotional support and  comprehensive services.    Several different types of medicines may be prescribed for chronic pain. Work with your healthcare provider to develop a medicine plan that helps manage your pain.    Physical therapy can be very effective in helping reduce certain types of chronic pain.    Occupational therapy teaches you how to do routine tasks of daily living in ways that lessen your discomfort.    Psychological therapy can help you cope better with stress and pain.    Other therapies such as meditation, yoga, biofeedback, massage, and acupuncture can also help manage chronic pain.    Changing certain habits can help reduce chronic pain. They include:    Eating healthy    Developing an exercise routine    Getting enough sleep at night    Stopping smoking and limiting alcohol use    Losing excess weight  Follow-up care  Follow up with your healthcare provider as advised. Let your healthcare provider know if your current treatment plan is working or if changes are needed.  Resources  For more information, contact:    American [place] for Headache Society www.achenet.org    American Chronic Pain Association www.theacpa.org 889-866-9536    9599-5577 [employer]. [address] [place] [address]. All rights reserved. This information is not intended as a substitute for professional medical care. Always follow your healthcare professional's instructions.          Nonsurgical Treatment of Cervical Spine Problems  Cervical spine problems can often be treated without surgery. Choices include rest, medicines, or injections. Your healthcare provider may also suggest physical therapy or certain exercises. These may all help to relieve your symptoms. These treatments are often successful. But if your symptoms don t subside, talk to your healthcare provider. He or she may tell you that surgery is your best choice.  Relieving your symptoms  Your healthcare provider may recommend:    Medicines. These help to  reduce pain and inflammation.    Epidural steroid injections. These are injections into the spinal canal near the spinal cord. They may relieve severe pain and reduce inflammation.    Restricting aggravating activities. This can help to give your cervical spine time to heal.    A soft cervical collar. This can help to support your head while keeping your cervical spine aligned.    Traction. This may help relieve pressure on the irritated nerves.  Restoring mobility and strength  Your healthcare provider may recommend that you work with a physical therapist. This can help you regain mobility and strength in your neck. Physical therapy may last for 4 to 8 weeks. It may include:    Exercises to improve your neck s range of motion and strength.    Evaluation and correction of posture and body movements. This can correct problems that affect your cervical spine.    Heat, massage, and traction to help relieve your symptoms.  Self-care  You ll take an active role in your own therapy. To protect your neck from further injury:    Follow any exercise program given to you by your healthcare provider or physical therapist.    Practice good posture while sitting, standing, or moving.    Have your workspace evaluated. Rearrange it if needed.    When lying down, support your neck. You can use a special cervical pillow or rolled-up towel.     2810-4610 [employer]. [address] [place] [address]. All rights reserved. This information is not intended as a substitute for professional medical care. Always follow your healthcare professional's instructions.            24 Hour Appointment Hotline       To make an appointment at any [facility], call [phone] ([phone]). If you don't have a family doctor or clinic, we will help you find one. [facility] are conveniently located to serve the needs of you and your family.             Review of your medicines      START taking        Dose /  Directions Last dose taken    cyclobenzaprine 10 MG tablet   Commonly known as:  FLEXERIL   Dose:  10 mg   Quantity:  20 tablet        Take 1 tablet (10 mg) by mouth 3 times daily as needed for muscle spasms   Refills:  0        gabapentin 300 MG capsule   Commonly known as:  NEURONTIN   Quantity:  90 capsule        Take 1 tablet (300 mg) every night for 1-3 days, then 1 tablet twice daily for 1-3 days, then 1 tablet three times daily   Refills:  0        lidocaine 5 % Patch   Commonly known as:  LIDODERM   Dose:  1 patch   Quantity:  10 patch        Place 1 patch onto the skin every 24 hours for 10 days   Refills:  0        methylPREDNISolone 4 MG tablet   Commonly known as:  MEDROL DOSEPAK   Quantity:  21 tablet        Follow package instructions   Refills:  0          Our records show that you are taking the medicines listed below. If these are incorrect, please call your family doctor or clinic.        Dose / Directions Last dose taken    ibuprofen 800 MG tablet   Commonly known as:  ADVIL/MOTRIN   Dose:  800 mg   Quantity:  60 tablet        Take 1 tablet (800 mg) by mouth every 8 hours as needed for moderate pain   Refills:  0                Prescriptions were sent or printed at these locations (4 Prescriptions)                   Other Prescriptions                Printed at Department/Unit printer (4 of 4)         gabapentin (NEURONTIN) 300 MG capsule               lidocaine (LIDODERM) 5 % Patch               cyclobenzaprine (FLEXERIL) 10 MG tablet               methylPREDNISolone (MEDROL DOSEPAK) 4 MG tablet                Orders Needing Specimen Collection     None      Pending Results     No orders found from 6/6/2017 to 6/9/2017.            Pending Culture Results     No orders found from 6/6/2017 to 6/9/2017.            Pending Results Instructions     If you had any lab results that were not finalized at the time of your Discharge, you can call the ED Lab Result RN at [phone]. You will be contacted  by this team for any positive Lab results or changes in treatment. The nurses are available 7 days a week from 10A to 6:30P.  You can leave a message 24 hours per day and they will return your call.        Test Results From Your Hospital Stay               Clinical Quality Measure: Blood Pressure Screening     Your blood pressure was checked while you were in the emergency department today. The last reading we obtained was  BP: 141/74 . Please read the guidelines below about what these numbers mean and what you should do about them.  If your systolic blood pressure (the top number) is less than 120 and your diastolic blood pressure (the bottom number) is less than 80, then your blood pressure is normal. There is nothing more that you need to do about it.  If your systolic blood pressure (the top number) is 120-139 or your diastolic blood pressure (the bottom number) is 80-89, your blood pressure may be higher than it should be. You should have your blood pressure rechecked within a year by a primary care provider.  If your systolic blood pressure (the top number) is 140 or greater or your diastolic blood pressure (the bottom number) is 90 or greater, you may have high blood pressure. High blood pressure is treatable, but if left untreated over time it can put you at risk for heart attack, stroke, or kidney failure. You should have your blood pressure rechecked by a primary care provider within the next 4 weeks.  If your provider in the emergency department today gave you specific instructions to follow-up with your doctor or provider even sooner than that, you should follow that instruction and not wait for up to 4 weeks for your follow-up visit.        Thank you for choosing [facility]       Thank you for choosing [facility] for your care. Our goal is always to provide you with excellent care. Hearing back from our patients is one way we can continue to improve our services. Please take a few minutes to complete the  "written survey that you may receive in the mail after you visit with us. Thank you!        [employer] Information     [employer] lets you send messages to your doctor, view your test results, renew your prescriptions, schedule appointments and more. To sign up, go to [email or web address] . Click on \"Log in\" on the left side of the screen, which will take you to the Welcome page. Then click on \"Sign up Now\" on the right side of the page.     You will be asked to enter the access code listed below, as well as some personal information. Please follow the directions to create your username and password.     Your access code is: [email or web address]  Expires: 2017  6:17 PM     Your access code will  in 90 days. If you need help or a new code, please call your [facility] clinic or [phone].        Care EveryWhere ID     This is your Care EveryWhere ID. This could be used by other organizations to access your [facility] medical records  [id number]        After Visit Summary       This is your record. Keep this with you and show to your community pharmacist(s) and doctor(s) at your next visit.                  "

## 2017-06-08 NOTE — ED NOTES
Pt stated was seen  At [address] for right arm pain after carrying large table saw monday. And is still having pain.    Pt A&O x 3, CMS x 3, ABCD's adequate in triage

## 2017-06-09 NOTE — DISCHARGE INSTRUCTIONS
Please make an appointment to follow up with [facility] [phone] in 5-7 days .        Chronic Pain  Pain serves an important role. It lets you know something is wrong that needs your attention. When the body heals, pain normally goes away.  When pain lasts longer than six months, it is called  chronic  pain. This is pain that is present even after the body has healed. Chronic pain can cause mood problems and get in the way of your relationships and your daily life.  A number of conditions can cause chronic pain. Some of the more common include:    Previous surgery    An old injury    Infection    Diseases such as diabetes    Nerve damage    Back injury    Arthritis    Migraine or other headaches    Fibromyalgia    Cancer  Depression and stress can make chronic pain symptoms worse. In some cases, a cause for the pain cannot be found.   Treatment  Treatment can greatly reduce pain. In many cases, pain can become less severe, occur less often, and interfere less with your daily life. Chronic pain is often treated with a combination of medicines, therapies, and lifestyle changes. You will work closely with your healthcare provider to find a treatment plan that works best for you.    Ask your healthcare provider for a referral to a pain management specialty center. These can provide the most recent and proven pain management strategies, along with emotional support and comprehensive services.    Several different types of medicines may be prescribed for chronic pain. Work with your healthcare provider to develop a medicine plan that helps manage your pain.    Physical therapy can be very effective in helping reduce certain types of chronic pain.    Occupational therapy teaches you how to do routine tasks of daily living in ways that lessen your discomfort.    Psychological therapy can help you cope better with stress and pain.    Other therapies such as meditation, yoga, biofeedback, massage, and acupuncture can  also help manage chronic pain.    Changing certain habits can help reduce chronic pain. They include:    Eating healthy    Developing an exercise routine    Getting enough sleep at night    Stopping smoking and limiting alcohol use    Losing excess weight  Follow-up care  Follow up with your healthcare provider as advised. Let your healthcare provider know if your current treatment plan is working or if changes are needed.  Resources  For more information, contact:    [employer] [place] for Headache Society www.achenet.org    American Chronic Pain Association www.theacpa.org [phone]    2936-7013 [employer]. [address] [place], [place] [address]. All rights reserved. This information is not intended as a substitute for professional medical care. Always follow your healthcare professional's instructions.          Nonsurgical Treatment of Cervical Spine Problems  Cervical spine problems can often be treated without surgery. Choices include rest, medicines, or injections. Your healthcare provider may also suggest physical therapy or certain exercises. These may all help to relieve your symptoms. These treatments are often successful. But if your symptoms don t subside, talk to your healthcare provider. He or she may tell you that surgery is your best choice.  Relieving your symptoms  Your healthcare provider may recommend:    Medicines. These help to reduce pain and inflammation.    Epidural steroid injections. These are injections into the spinal canal near the spinal cord. They may relieve severe pain and reduce inflammation.    Restricting aggravating activities. This can help to give your cervical spine time to heal.    A soft cervical collar. This can help to support your head while keeping your cervical spine aligned.    Traction. This may help relieve pressure on the irritated nerves.  Restoring mobility and strength  Your healthcare provider may recommend that you work with a physical  therapist. This can help you regain mobility and strength in your neck. Physical therapy may last for 4 to 8 weeks. It may include:    Exercises to improve your neck s range of motion and strength.    Evaluation and correction of posture and body movements. This can correct problems that affect your cervical spine.    Heat, massage, and traction to help relieve your symptoms.  Self-care  You ll take an active role in your own therapy. To protect your neck from further injury:    Follow any exercise program given to you by your healthcare provider or physical therapist.    Practice good posture while sitting, standing, or moving.    Have your workspace evaluated. Rearrange it if needed.    When lying down, support your neck. You can use a special cervical pillow or rolled-up towel.     8330-8980 [employer]. [address] [place] [address], [place] [address]. All rights reserved. This information is not intended as a substitute for professional medical care. Always follow your healthcare professional's instructions.

## 2017-06-09 NOTE — ED PROVIDER NOTES
"  History   Chief Complaint:  Neck Pain and Shoulder Pain    HPI   Eladio Navarro is a 31 year old male who presents with right shoulder and neck pain. The patient reports that he and a friend were lifting a table saw 2 days ago when the table slipped and fell on his forearm. He states he was initially seen in [facility] ED and was seen in the [facility] ED last night - no imaging was performed, Narco was given. He denies weakness, tingling or numbness in his arm. Of note, this is not a new injury. He has an appointment with his PCP next week.     Allergies:  No known drug allergies    Medications:    Advil    Past Medical History:    Chronic shoulder pain  Degenerative disc disease, cervical    Past Surgical History:    Orthopedic surgery    Family History:    History reviewed. No pertinent family history.     Social History:  Marital Status:  Single [1]  Smoking status: current some day  alcohol use: no     Review of Systems   Constitutional: Negative for chills and fever.   Respiratory: Negative for cough and shortness of breath.    Cardiovascular: Negative for chest pain.   Gastrointestinal: Negative for abdominal pain, diarrhea, nausea and vomiting.   Musculoskeletal: Positive for arthralgias (shoulder) and neck pain. Negative for back pain.   Neurological: Negative for syncope.   All other systems reviewed and are negative.    Physical Exam   Patient Vitals for the past 24 hrs:   BP Temp Temp src Pulse Resp SpO2 Height Weight   06/08/17 2000 141/74 - - - - 96 % - -   06/08/17 1928 (!) 150/92 98.8  F (37.1  C) Oral 77 17 96 % 1.778 m (5' 10\") 86.2 kg (190 lb)     Physical Exam    HEENT:  PERRL, measuring 4mm bilaterally, EOMI, visual acuity and fields intact, conjunctiva and lids normal, external ears unremarkable, Nares clear bilaterally, mmm, oropharynx without tonsillar hypertrophy/erythema/exudate    Neck: supple, ROM intact, + ttp R cervical paraspinous muscle and R trap, no cervical " lymphadenopathy    Lungs:  CTAB,  no resp distress    CV: rrr, no m/r/g, ppi    Abd: soft, nontender, nondistended, no rebound/masses/guarding/hsm    Ext: no peripheral edema    Skin: warm, dry, well perused, no rashes/bruising/lesions on exposed skin    Neuro:   alert, follows commands, speech clear, CN 2-12 intact,   strength 5/5 and symmetric in BUE intrinsic hand muscles as well as BLE  SILT in all 4 ext    cerebellar testing unremarkable  gait stable    Psych: Normal mood, normal affect      Emergency Department Course   Interventions:  2037  Flexeril 10 mg oral    Emergency Department Course:  Past medical records, nursing notes, and vitals reviewed.   I performed an exam of the patient and obtained history, as documented above..  2041: I rechecked the patient.  Findings and plan explained to the Patient. Patient discharged home with instructions regarding supportive care, medications, and reasons to return. The importance of close follow-up was reviewed.     Impression & Plan    Medical Decision Making:  Eladio Gibbons 31 year old male with a history of chronic pain here with chronic right shoulder pain, acute flare up after injury a couple of days ago. He has a non-focal neurology examination here no indication for imaging or neurosurgical intervention. Can be discharged home. Concerned for drug seeking behavior. Here will treat with non-opiate means and had a long discussion regarding risk/befits opioids.    Diagnosis:    ICD-10-CM   1. Cervical pain M54.2   2. Other chronic pain G89.29     Disposition:  discharged to home    Discharge Medications:  New Prescriptions    CYCLOBENZAPRINE (FLEXERIL) 10 MG TABLET    Take 1 tablet (10 mg) by mouth 3 times daily as needed for muscle spasms    GABAPENTIN (NEURONTIN) 300 MG CAPSULE    Take 1 tablet (300 mg) every night for 1-3 days, then 1 tablet twice daily for 1-3 days, then 1 tablet three times daily    LIDOCAINE (LIDODERM) 5 % PATCH    Place 1 patch onto the skin  every 24 hours for 10 days    METHYLPREDNISOLONE (MEDROL DOSEPAK) 4 MG TABLET    Follow package instructions       Anai De Jesus  6/8/2017   [facility] EMERGENCY DEPARTMENT    I, Anai De Jesus, am serving as a scribe at 8:20 PM on 6/8/2017 to document services personally performed by Gerald Johnson MD based on my observations and the provider's statements to me.       Gerald Johnson MD  06/09/17 0158

## 2017-06-09 NOTE — ED NOTES
Seen last night  Sent home with ibuprofen no relief  Right side neck and shoulder pain   After carrying table saw 2 days ago  Was told to return if symptoms worsen  Rates pain 9/10  No xray or scans done  Did make appt with md but can not get into until next week   No numbness or tingling down arms  Still slight numb at site of thumb lacertion  Abc intact

## 2017-06-14 ENCOUNTER — HOSPITAL ENCOUNTER (EMERGENCY)
Facility: CLINIC | Age: 32
Discharge: HOME | End: 2017-06-14
Attending: EMERGENCY MEDICINE | Admitting: EMERGENCY MEDICINE
Payer: COMMERCIAL

## 2017-06-14 VITALS
DIASTOLIC BLOOD PRESSURE: 78 MMHG | RESPIRATION RATE: 18 BRPM | OXYGEN SATURATION: 99 % | SYSTOLIC BLOOD PRESSURE: 115 MMHG | HEART RATE: 75 BPM | TEMPERATURE: 97.6 F

## 2017-06-14 DIAGNOSIS — S16.1XXA CERVICAL STRAIN, INITIAL ENCOUNTER: ICD-10-CM

## 2017-06-14 DIAGNOSIS — S46.911A RIGHT SHOULDER STRAIN, INITIAL ENCOUNTER: ICD-10-CM

## 2017-06-14 DIAGNOSIS — S29.012A UPPER BACK STRAIN, INITIAL ENCOUNTER: ICD-10-CM

## 2017-06-14 PROCEDURE — 99283 EMERGENCY DEPT VISIT LOW MDM: CPT

## 2017-06-14 PROCEDURE — 25000132 ZZH RX MED GY IP 250 OP 250 PS 637: Performed by: EMERGENCY MEDICINE

## 2017-06-14 RX ORDER — HYDROXYZINE PAMOATE 25 MG/1
25 CAPSULE ORAL 3 TIMES DAILY PRN
Qty: 120 CAPSULE | Refills: 0 | Status: SHIPPED | OUTPATIENT
Start: 2017-06-14

## 2017-06-14 RX ORDER — HYDROCODONE BITARTRATE AND ACETAMINOPHEN 5; 325 MG/1; MG/1
2 TABLET ORAL ONCE
Status: COMPLETED | OUTPATIENT
Start: 2017-06-14 | End: 2017-06-14

## 2017-06-14 RX ORDER — GABAPENTIN 300 MG/1
300 CAPSULE ORAL 3 TIMES DAILY
Qty: 21 CAPSULE | Refills: 0 | Status: SHIPPED | OUTPATIENT
Start: 2017-06-14

## 2017-06-14 RX ORDER — METHOCARBAMOL 500 MG/1
1000 TABLET, FILM COATED ORAL 3 TIMES DAILY PRN
Qty: 30 TABLET | Refills: 1 | Status: SHIPPED | OUTPATIENT
Start: 2017-06-14

## 2017-06-14 RX ADMIN — HYDROCODONE BITARTRATE AND ACETAMINOPHEN 2 TABLET: 5; 325 TABLET ORAL at 18:44

## 2017-06-14 ASSESSMENT — ENCOUNTER SYMPTOMS
NECK PAIN: 1
WEAKNESS: 0
ARTHRALGIAS: 1
NUMBNESS: 0

## 2017-06-14 NOTE — ED PROVIDER NOTES
History     Chief Complaint:  Neck and Arm Pain    HPI:   Eladio Navarro is a 31 year old male with a history of chronic pain and degenerative disc disease who presents with neck and arm pain. The patient was first evaluated on 6/8 when he sustained an injury to his right shoulder and neck when he was moving a table saw. He was safely discharged home with Gabapentin, Medrol Dosepak, and Flexeril. Despite these treatments, he continues to experience pain and has presented to the ED on two other occasions for this. Today while he was helping a friend move a mattress, his pain was exacerbated by this activity, prompting his ED visit. He notes concern for a possible muscle tear since he has a bruise to the inside of his right bicep (after the first incident). Of note, he tried to get an orthopedic appointment with [facility], but they won't be able to see him for a couple of weeks. Here, he states his pain is the worst when he raises his right arm laterally. He denies numbness, tingling or weakness. Denies any other injuries.    Allergies:  NKDA     Medications:    Gabapentin (neurontin) 300 mg capsule  Methylprednisolone (medrol dosepak) 4 mg tablet  Lidocaine (lidoderm) 5 % patch  Cyclobenzaprine (flexeril) 10 mg tablet  Ibuprofen (advil/motrin) 800 mg tablet    Past Medical History:    Chronic pain syndrome  Degenerative disc disease    Past Surgical History:    Orthopedic surgery    Social History:  Tobacco: Current Some Day Smoker  Alcohol: Negative  Marital Status:  Single     Review of Systems   Musculoskeletal: Positive for arthralgias and neck pain.   Neurological: Negative for weakness and numbness.   All other systems reviewed and are negative.      Physical Exam     Patient Vitals for the past 24 hrs:   BP Temp Temp src Heart Rate Resp SpO2   06/14/17 1714 135/78 97.6  F (36.4  C) Oral 80 18 99 %      Physical Exam:  General: Adult male sitting upright.  Neck: Tender in the right paraspinal musculature  into the right trapezius. No palpable edema or crepitus. No palpable mass. No bony midline tenderness to palpation. Normal AROM.  CV: 2+ radial pulse equal bilaterally.  MSK: Normal active ROM of the right shoulder to about 90 degrees abduction. Tender over the right anterior shoulder into the right pectoral muscle without palpable mass, edema, or crepitus. No bony tenderness to palpation.  Skin: Warm and dry. Older appearing area of ecchymosis of the right upper medial arm. Skin otherwise normal in appearance in visible areas.  Neuro: Alert and oriented. Responds appropriately to all questions and commands. No focal findings appreciated. Normal muscle tone. Sensation intact to light touch over all dermatomes of the right upper extremity.  Psych: Normal mood and affect.     Emergency Department Course     Interventions:  1844- Norco 5-325 mg 2 tablet PO    Emergency Department Course:  Past medical records, nursing notes, and vitals reviewed.  1814: I performed an exam of the patient as documented above. GCS 15. Clinical findings and plan explained to the Patient. Patient discharged home with instructions regarding supportive care, medications, and reasons to return as well as the importance of close follow-up were reviewed.      The above intervention was administered.     Impression & Plan      Medical Decision Making:  Eladio Navarro is a 31 year old male with a past history of regular narcotic use which he is no longer using who presents to the emergency department for neck and shoulder pain. He has been seen and evaluated twice in this ED for the same issue. He notes today that he was moving a mattress, which exacerbated his pain. He is neurovascularly intact. There is no indication for imaging, as I do not suspect fracture or spinal injury. I suspect that this is a musculoskeletal strain of the back, neck, and shoulder. He was recommended ongoing Gabapentin, as originally prescribed by Dr. Johnson, as well as  ongoing Ibuprofen for antiinflammatory affects. Since Flexeril was not seeming to work for him, I gave him a prescription for Robaxin. I recommended avoidence of narcotics on a regular basis and should continue to follow up with his primary doctor; he notes he has already scheduled this for early next week. All questions were answered prior to discharge.     Diagnosis:    ICD-10-CM   1. Cervical strain, initial encounter S16.1XXA   2. Right shoulder strain, initial encounter S46.911A   3. Upper back strain, initial encounter S29.012A     Disposition:  Discharged to home with Gabapentin, Vistaril, and Robaxin.    Discharge Medications:  New Prescriptions    GABAPENTIN (NEURONTIN) 300 MG CAPSULE    Take 1 capsule (300 mg) by mouth 3 times daily    HYDROXYZINE (VISTARIL) 25 MG CAPSULE    Take 1 capsule (25 mg) by mouth 3 times daily as needed for other (pain flare)    METHOCARBAMOL (ROBAXIN) 500 MG TABLET    Take 2 tablets (1,000 mg) by mouth 3 times daily as needed for muscle spasms     Idania Ahmadi  6/14/2017   [facility] EMERGENCY DEPARTMENT    I, Idania Ahmadi, am serving as a scribe at 6:14 PM on 6/14/2017 to document services personally performed by Ivis Bronson MD based on my observations and the provider's statements to me.         Ivis Bronson MD  06/17/17 5096

## 2017-06-14 NOTE — ED NOTES
Assisted with Pt. Triage, [employer] monitoring devices (BP, and pulse ox) onto Patient. Oral temp taken

## 2017-06-14 NOTE — ED NOTES
Pt educated on Norco. Pts sister is on her way to drive pt home. D/c instructions reviewed with pt educated on follow-up with ortho, given referral, educated on home care as reviewed in AVS. Given prescriptions for Norco, Gabapentin and Vistaril. Educated on drowsy side effect of Vistaril and Robaxin and to not drink, drive or operate machinery while taking.

## 2017-06-14 NOTE — DISCHARGE INSTRUCTIONS
Back Sprain or Strain    Injury to the muscles (strain) or ligaments (sprain) around the spine can be troubling. Injury may occur after a sudden forceful twisting or bending force such as in a car accident, after a simple awkward movement, or after lifting something heavy with poor body positioning. In any case, muscle spasm is often present and adds to the pain.  Thankfully, most people feel better in 1 to 2 weeks, and most of the rest in 1 to 2 months. Most people can remain active. Unless you had a forceful or traumatic physical injury such as a car accident or fall, X-rays may not be ordered for the first evaluation of a back sprain or strain. If pain continues and does not respond to medical treatment, your healthcare provider may then order X-rays and other tests.  Home care  The following guidelines will help you care for your injury at home:    When in bed, try to find a comfortable position. A firm mattress is best. Try lying flat on your back with pillows under your knees. You can also try lying on your side with your knees bent up toward your chest and a pillow between your knees.    Don't sit for long periods. Try not to take long car rides or take other trips that have you sitting for a long time. This puts more stress on the lower back than standing or walking.    During the first 24 to 72 hours after an injury or flare-up, apply an ice pack to the painful area for 20 minutes. Then remove it for 20 minutes. Do this for 60 to 90 minutes, or several times a day. This will reduce swelling and pain. Be sure to wrap the ice pack in a thin towel or plastic to protect your skin.    You can start with ice, then switch to heat. Heat from a hot shower, hot bath, or heating pad reduces pain and works well for muscle spasms. Put heat on the painful area for 20 minutes, then remove for 20 minutes. Do this for 60 to 90 minutes, or several times a day. Do not use a heating pad while sleeping. It can burn the  skin.    You can alternate the ice and heat. Talk with your healthcare provider to find out the best treatment or therapy for your back pain.    Therapeutic massage will help relax the back muscles without stretching them.    Be aware of safe lifting methods. Do not lift anything over 15 pounds until all of the pain is gone.  Medicines  Talk to your healthcare provider before using medicines, especially if you have other health problems or are taking other medicines.    You may use acetaminophen or ibuprofen to control pain, unless another pain medicine was prescribed. If you have chronic conditions like diabetes, liver or kidney disease, stomach ulcers, or gastrointestinal bleeding, or are taking blood-thinner medicines, talk with your doctor before taking any medicines.    Be careful if you are given prescription medicines, narcotics, or medicine for muscle spasm. They can cause drowsiness, and affect your coordination, reflexes, and judgment. Do not drive or operate heavy machinery when taking these types of medicines. Only take pain medicine as prescribed by your healthcare provider.  Follow-up care  Follow up with your healthcare provider, or as advised. You may need physical therapy or more tests if your symptoms get worse.  If you had X-rays your healthcare provider may be checking for any broken bones, breaks, or fractures. Bruises and sprains can sometimes hurt as much as a fracture. These injuries can take time to heal completely. If your symptoms don t improve or they get worse, talk with your healthcare provider. You may need a repeat X-ray or other tests.  Call 911  Call for emergency care if any of the following occur:    Trouble breathing    Confused    Very drowsy or trouble awakening    Fainting or loss of consciousness    Rapid or very slow heart rate    Loss of bowel or bladder control  When to seek medical advice  Call your healthcare provider right away if any of the following occur:    Pain  gets worse or spreads to your arms or legs    Weakness or numbness in one or both arms or legs    Numbness in the groin or genital area  Date Last Reviewed: 6/1/2016 2000-2017 [employer]. [address] [place] [address], [place] [address]. All rights reserved. This information is not intended as a substitute for professional medical care. Always follow your healthcare professional's instructions.          Neck Sprain or Strain  A sudden force that causes turning or bending of the neck can cause sprain or strain. An example would be the force from a car accident. This can stretch or tear muscles called a strain. It can also stretch or tear ligaments called a sprain. Either of these can cause neck pain. Sometimes neck pain occurs after a simple awkward movement. In either case, muscle spasm is commonly present and contributes to the pain.    Unless you had a forceful physical injury (for example, a car accident or fall), X-rays are usually not ordered for the initial evaluation of neck pain. If pain continues and dose not respond to medical treatment, X-rays and other tests may be performed at a later time.  Home care    You may feel more soreness and spasm the first few days after the injury. Rest until symptoms begin to improve.    When lying down, use a comfortable pillow or a rolled towel that supports the head and keeps the spine in a neutral position. The position of the head should not be tilted forward or backward.    Apply an ice pack over the injured area for 15 to 20 minutes every 3 to 6 hours. You should do this for the first 24 to 48 hours. You can make an ice pack by filling a plastic bag that seals at the top with ice cubes and then wrapping it with a thin towel. After 48 hours, apply heat (warm shower or warm bath) for 15 to 20 minutes several times a day, or alternate ice and heat.    You may use over-the-counter pain medicine to control pain, unless another pain medicine was prescribed. If  you have chronic liver or kidney disease or ever had a stomach ulcer or GI bleeding, talk with your healthcare provider before using these medicines.    If a soft cervical collar was prescribed, it should be worn only for periods of increased pain. It should not be worn for more than 3 hours a day, or for a period longer than 1 to 2 weeks.  Follow-up care  Follow up with your healthcare provider as directed. Physical therapy may be needed.  Sometimes fractures don t show up on the first X-ray. Bruises and sprains can sometimes hurt as much as a fracture. These injuries can take time to heal completely. If your symptoms don t improve or they get worse, talk with your healthcare provider. You may need a repeat X-ray or other tests. If X-rays were taken, you will be told of any new findings that may affect your care.  Call 911  Call 911 if you have:    Neck swelling, difficulty or painful swallowing    Difficulty breathing    Chest pain  When to seek medical advice  Call your healthcare provider right away if any of these occur:    Pain becomes worse or spreads into your arms    Weakness or numbness in one or both arms  Date Last Reviewed: 11/19/2015 2000-2017 [employer]. [address] [place] [address]. All rights reserved. This information is not intended as a substitute for professional medical care. Always follow your healthcare professional's instructions.          Muscle Strain in the Extremities  A muscle strain is a stretching and tearing of muscle fibers. This causes pain, especially when you move that muscle. There may also be some swelling and bruising.  Home care    Keep the hurt area raised to reduce pain and swelling. This is especially important during the first 48 hours.    Apply an ice pack over the injured area for 15 to 20 minutes every 3 to 6 hours. You should do this for the first 24 to 48 hours. You can make an ice pack by filling a plastic bag that seals at the top  with ice cubes and then wrapping it with a thin towel. Be careful not to injure your skin with the ice treatments. Ice should never be applied directly to skin. Continue the use of ice packs for relief of pain and swelling as needed. After 48 hours, apply heat (warm shower or warm bath) for 15 to 20 minutes several times a day, or alternate ice and heat.    You may use over-the-counter pain medicine to control pain, unless another medicine was prescribed. If you have chronic liver or kidney disease or ever had a stomach ulcer or GI bleeding, talk with your healthcare provider before using these medicines.    For leg strains: If crutches have been recommended, don t put full weight on the hurt leg until you can do so without pain. You can return to sports when you are able to hop and run on the injured leg without pain.  Follow-up care  Follow up with your healthcare provider, or as advised.  When to seek medical advice  Call your healthcare provider right away if any of these occur:    The toes of the injured leg become swollen, cold, blue, numb, or tingly    Pain or swelling increases  Date Last Reviewed: 11/19/2015 2000-2017 [employer]. [address] [place] [address], [place] [address]. All rights reserved. This information is not intended as a substitute for professional medical care. Always follow your healthcare professional's instructions.

## 2017-06-14 NOTE — ED NOTES
Pt injured R shoulder/ neck last week moving a table saw. Pt was evaluated last week here for it. Pt states it has been getting worse, and moved a mattress today which also aggrevated it. R neck/ shouler/ arm pit pain. Denies numbness tingling. Has Dr garcia on Tuesday. ABCs intact, ambulated from triage.

## 2017-06-14 NOTE — ED AVS SNAPSHOT
[facility] Emergency Department    [address]    [facility] [address]    Phone:  [phone]    Fax:  [phone]                                       Eladio Navarro   MRN: [id number]    Department:  [facility] Emergency Department   Date of Visit:  6/14/2017           After Visit Summary Signature Page     I have received my discharge instructions, and my questions have been answered. I have discussed any challenges I see with this plan with the nurse or doctor.    ..........................................................................................................................................  Patient/Patient Representative Signature      ..........................................................................................................................................  Patient Representative Print Name and Relationship to Patient    ..................................................               ................................................  Date                                            Time    ..........................................................................................................................................  Reviewed by Signature/Title    ...................................................              ..............................................  Date                                                            Time

## 2017-06-14 NOTE — ED AVS SNAPSHOT
[facility]    [address]    [facility] [address]    Phone:  [phone]    Fax:  [phone]                                       Eladio Navarro   MRN: [id number]    Department:  [facility]   Date of Visit:  6/14/2017           Patient Information     Date Of Birth          1985        Your diagnoses for this visit were:     Cervical strain, initial encounter     Right shoulder strain, initial encounter     Upper back strain, initial encounter        You were seen by Ivis Bronson MD.      Follow-up Information     Follow up with Primary care clinic.    Why:  as scheduled        [facility]    Why:  within 1 week for reassessment    Contact information:    [address]  [address]  [facility] [id number] [phone]        Follow up with [facility].    Specialty:  EMERGENCY MEDICINE    Why:  As needed, If symptoms worsen    Contact information:    [address] [facility] [id number] [phone]        Discharge Instructions         Back Sprain or Strain    Injury to the muscles (strain) or ligaments (sprain) around the spine can be troubling. Injury may occur after a sudden forceful twisting or bending force such as in a car accident, after a simple awkward movement, or after lifting something heavy with poor body positioning. In any case, muscle spasm is often present and adds to the pain.  Thankfully, most people feel better in 1 to 2 weeks, and most of the rest in 1 to 2 months. Most people can remain active. Unless you had a forceful or traumatic physical injury such as a car accident or fall, X-rays may not be ordered for the first evaluation of a back sprain or strain. If pain continues and does not respond to medical treatment, your healthcare provider may then order X-rays and other tests.  Home  care  The following guidelines will help you care for your injury at home:    When in bed, try to find a comfortable position. A firm mattress is best. Try lying flat on your back with pillows under your knees. You can also try lying on your side with your knees bent up toward your chest and a pillow between your knees.    Don't sit for long periods. Try not to take long car rides or take other trips that have you sitting for a long time. This puts more stress on the lower back than standing or walking.    During the first 24 to 72 hours after an injury or flare-up, apply an ice pack to the painful area for 20 minutes. Then remove it for 20 minutes. Do this for 60 to 90 minutes, or several times a day. This will reduce swelling and pain. Be sure to wrap the ice pack in a thin towel or plastic to protect your skin.    You can start with ice, then switch to heat. Heat from a hot shower, hot bath, or heating pad reduces pain and works well for muscle spasms. Put heat on the painful area for 20 minutes, then remove for 20 minutes. Do this for 60 to 90 minutes, or several times a day. Do not use a heating pad while sleeping. It can burn the skin.    You can alternate the ice and heat. Talk with your healthcare provider to find out the best treatment or therapy for your back pain.    Therapeutic massage will help relax the back muscles without stretching them.    Be aware of safe lifting methods. Do not lift anything over 15 pounds until all of the pain is gone.  Medicines  Talk to your healthcare provider before using medicines, especially if you have other health problems or are taking other medicines.    You may use acetaminophen or ibuprofen to control pain, unless another pain medicine was prescribed. If you have chronic conditions like diabetes, liver or kidney disease, stomach ulcers, or gastrointestinal bleeding, or are taking blood-thinner medicines, talk with your doctor before taking any medicines.    Be  careful if you are given prescription medicines, narcotics, or medicine for muscle spasm. They can cause drowsiness, and affect your coordination, reflexes, and judgment. Do not drive or operate heavy machinery when taking these types of medicines. Only take pain medicine as prescribed by your healthcare provider.  Follow-up care  Follow up with your healthcare provider, or as advised. You may need physical therapy or more tests if your symptoms get worse.  If you had X-rays your healthcare provider may be checking for any broken bones, breaks, or fractures. Bruises and sprains can sometimes hurt as much as a fracture. These injuries can take time to heal completely. If your symptoms don t improve or they get worse, talk with your healthcare provider. You may need a repeat X-ray or other tests.  Call 911  Call for emergency care if any of the following occur:    Trouble breathing    Confused    Very drowsy or trouble awakening    Fainting or loss of consciousness    Rapid or very slow heart rate    Loss of bowel or bladder control  When to seek medical advice  Call your healthcare provider right away if any of the following occur:    Pain gets worse or spreads to your arms or legs    Weakness or numbness in one or both arms or legs    Numbness in the groin or genital area  Date Last Reviewed: 6/1/2016 2000-2017 [employer]. [address] [place] [address], [place] [id number]. All rights reserved. This information is not intended as a substitute for professional medical care. Always follow your healthcare professional's instructions.          Neck Sprain or Strain  A sudden force that causes turning or bending of the neck can cause sprain or strain. An example would be the force from a car accident. This can stretch or tear muscles called a strain. It can also stretch or tear ligaments called a sprain. Either of these can cause neck pain. Sometimes neck pain occurs after a simple awkward movement. In either  case, muscle spasm is commonly present and contributes to the pain.    Unless you had a forceful physical injury (for example, a car accident or fall), X-rays are usually not ordered for the initial evaluation of neck pain. If pain continues and dose not respond to medical treatment, X-rays and other tests may be performed at a later time.  Home care    You may feel more soreness and spasm the first few days after the injury. Rest until symptoms begin to improve.    When lying down, use a comfortable pillow or a rolled towel that supports the head and keeps the spine in a neutral position. The position of the head should not be tilted forward or backward.    Apply an ice pack over the injured area for 15 to 20 minutes every 3 to 6 hours. You should do this for the first 24 to 48 hours. You can make an ice pack by filling a plastic bag that seals at the top with ice cubes and then wrapping it with a thin towel. After 48 hours, apply heat (warm shower or warm bath) for 15 to 20 minutes several times a day, or alternate ice and heat.    You may use over-the-counter pain medicine to control pain, unless another pain medicine was prescribed. If you have chronic liver or kidney disease or ever had a stomach ulcer or GI bleeding, talk with your healthcare provider before using these medicines.    If a soft cervical collar was prescribed, it should be worn only for periods of increased pain. It should not be worn for more than 3 hours a day, or for a period longer than 1 to 2 weeks.  Follow-up care  Follow up with your healthcare provider as directed. Physical therapy may be needed.  Sometimes fractures don t show up on the first X-ray. Bruises and sprains can sometimes hurt as much as a fracture. These injuries can take time to heal completely. If your symptoms don t improve or they get worse, talk with your healthcare provider. You may need a repeat X-ray or other tests. If X-rays were taken, you will be told of any new  findings that may affect your care.  Call 911  Call 911 if you have:    Neck swelling, difficulty or painful swallowing    Difficulty breathing    Chest pain  When to seek medical advice  Call your healthcare provider right away if any of these occur:    Pain becomes worse or spreads into your arms    Weakness or numbness in one or both arms  Date Last Reviewed: 11/19/2015 2000-2017 [employer]. [address] [place] [address], [place] [address]. All rights reserved. This information is not intended as a substitute for professional medical care. Always follow your healthcare professional's instructions.          Muscle Strain in the Extremities  A muscle strain is a stretching and tearing of muscle fibers. This causes pain, especially when you move that muscle. There may also be some swelling and bruising.  Home care    Keep the hurt area raised to reduce pain and swelling. This is especially important during the first 48 hours.    Apply an ice pack over the injured area for 15 to 20 minutes every 3 to 6 hours. You should do this for the first 24 to 48 hours. You can make an ice pack by filling a plastic bag that seals at the top with ice cubes and then wrapping it with a thin towel. Be careful not to injure your skin with the ice treatments. Ice should never be applied directly to skin. Continue the use of ice packs for relief of pain and swelling as needed. After 48 hours, apply heat (warm shower or warm bath) for 15 to 20 minutes several times a day, or alternate ice and heat.    You may use over-the-counter pain medicine to control pain, unless another medicine was prescribed. If you have chronic liver or kidney disease or ever had a stomach ulcer or GI bleeding, talk with your healthcare provider before using these medicines.    For leg strains: If crutches have been recommended, don t put full weight on the hurt leg until you can do so without pain. You can return to sports when you are able to hop and  run on the injured leg without pain.  Follow-up care  Follow up with your healthcare provider, or as advised.  When to seek medical advice  Call your healthcare provider right away if any of these occur:    The toes of the injured leg become swollen, cold, blue, numb, or tingly    Pain or swelling increases  Date Last Reviewed: 11/19/2015 2000-2017 [employer] [address] [place] [address], [place] [address]. All rights reserved. This information is not intended as a substitute for professional medical care. Always follow your healthcare professional's instructions.          24 Hour Appointment Hotline       To make an appointment at any [facility], call [phone] ([phone]). If you don't have a family doctor or clinic, we will help you find one. [facility] are conveniently located to serve the needs of you and your family.             Review of your medicines      START taking        Dose / Directions Last dose taken    hydrOXYzine 25 MG capsule   Commonly known as:  VISTARIL   Dose:  25 mg   Quantity:  120 capsule        Take 1 capsule (25 mg) by mouth 3 times daily as needed for other (pain flare)   Refills:  0        methocarbamol 500 MG tablet   Commonly known as:  ROBAXIN   Dose:  1000 mg   Quantity:  30 tablet        Take 2 tablets (1,000 mg) by mouth 3 times daily as needed for muscle spasms   Refills:  1          CONTINUE these medicines which may have CHANGED, or have new prescriptions. If we are uncertain of the size of tablets/capsules you have at home, strength may be listed as something that might have changed.        Dose / Directions Last dose taken    gabapentin 300 MG capsule   Commonly known as:  NEURONTIN   Dose:  300 mg   What changed:    - how much to take  - how to take this  - when to take this  - additional instructions   Quantity:  21 capsule        Take 1 capsule (300 mg) by mouth 3 times daily   Refills:  0          Our records show that you are taking the  medicines listed below. If these are incorrect, please call your family doctor or clinic.        Dose / Directions Last dose taken    cyclobenzaprine 10 MG tablet   Commonly known as:  FLEXERIL   Dose:  10 mg   Quantity:  20 tablet        Take 1 tablet (10 mg) by mouth 3 times daily as needed for muscle spasms   Refills:  0        ibuprofen 800 MG tablet   Commonly known as:  ADVIL/MOTRIN   Dose:  800 mg   Quantity:  60 tablet        Take 1 tablet (800 mg) by mouth every 8 hours as needed for moderate pain   Refills:  0        lidocaine 5 % Patch   Commonly known as:  LIDODERM   Dose:  1 patch   Quantity:  10 patch        Place 1 patch onto the skin every 24 hours for 10 days   Refills:  0        methylPREDNISolone 4 MG tablet   Commonly known as:  MEDROL DOSEPAK   Quantity:  21 tablet        Follow package instructions   Refills:  0                Prescriptions were sent or printed at these locations (3 Prescriptions)                   Other Prescriptions                Printed at Department/Unit printer (3 of 3)         methocarbamol (ROBAXIN) 500 MG tablet               gabapentin (NEURONTIN) 300 MG capsule               hydrOXYzine (VISTARIL) 25 MG capsule                Orders Needing Specimen Collection     None      Pending Results     No orders found from 6/12/2017 to 6/15/2017.            Pending Culture Results     No orders found from 6/12/2017 to 6/15/2017.            Pending Results Instructions     If you had any lab results that were not finalized at the time of your Discharge, you can call the ED Lab Result RN at [phone]. You will be contacted by this team for any positive Lab results or changes in treatment. The nurses are available 7 days a week from 10A to 6:30P.  You can leave a message 24 hours per day and they will return your call.        Test Results From Your Hospital Stay               Clinical Quality Measure: Blood Pressure Screening     Your blood pressure was checked while you were  "in the emergency department today. The last reading we obtained was  BP: 135/78 . Please read the guidelines below about what these numbers mean and what you should do about them.  If your systolic blood pressure (the top number) is less than 120 and your diastolic blood pressure (the bottom number) is less than 80, then your blood pressure is normal. There is nothing more that you need to do about it.  If your systolic blood pressure (the top number) is 120-139 or your diastolic blood pressure (the bottom number) is 80-89, your blood pressure may be higher than it should be. You should have your blood pressure rechecked within a year by a primary care provider.  If your systolic blood pressure (the top number) is 140 or greater or your diastolic blood pressure (the bottom number) is 90 or greater, you may have high blood pressure. High blood pressure is treatable, but if left untreated over time it can put you at risk for heart attack, stroke, or kidney failure. You should have your blood pressure rechecked by a primary care provider within the next 4 weeks.  If your provider in the emergency department today gave you specific instructions to follow-up with your doctor or provider even sooner than that, you should follow that instruction and not wait for up to 4 weeks for your follow-up visit.        Thank you for choosing [facility]       Thank you for choosing [facility] for your care. Our goal is always to provide you with excellent care. Hearing back from our patients is one way we can continue to improve our services. Please take a few minutes to complete the written survey that you may receive in the mail after you visit with us. Thank you!        [employer] Information     [employer] lets you send messages to your doctor, view your test results, renew your prescriptions, schedule appointments and more. To sign up, go to [email or web address] . Click on \"Log in\" on the left side of the screen, which will take you to " "the Welcome page. Then click on \"Sign up Now\" on the right side of the page.     You will be asked to enter the access code listed below, as well as some personal information. Please follow the directions to create your username and password.     Your access code is: [email or web address]  Expires: 2017  6:17 PM     Your access code will  in 90 days. If you need help or a new code, please call your [facility] clinic or [phone].        Care EveryWhere ID     This is your Care EveryWhere ID. This could be used by other organizations to access your [facility] medical records  [id number]        After Visit Summary       This is your record. Keep this with you and show to your community pharmacist(s) and doctor(s) at your next visit.                  "

## 2017-06-16 ENCOUNTER — HOSPITAL ENCOUNTER (EMERGENCY)
Facility: CLINIC | Age: 32
Discharge: HOME | End: 2017-06-16
Attending: EMERGENCY MEDICINE | Admitting: EMERGENCY MEDICINE
Payer: COMMERCIAL

## 2017-06-16 VITALS
TEMPERATURE: 97.9 F | SYSTOLIC BLOOD PRESSURE: 129 MMHG | DIASTOLIC BLOOD PRESSURE: 67 MMHG | HEART RATE: 85 BPM | OXYGEN SATURATION: 98 % | RESPIRATION RATE: 18 BRPM

## 2017-06-16 DIAGNOSIS — M25.511 RIGHT SHOULDER PAIN, UNSPECIFIED CHRONICITY: ICD-10-CM

## 2017-06-16 PROCEDURE — 99283 EMERGENCY DEPT VISIT LOW MDM: CPT

## 2017-06-16 PROCEDURE — 25000132 ZZH RX MED GY IP 250 OP 250 PS 637: Performed by: EMERGENCY MEDICINE

## 2017-06-16 RX ORDER — HYDROCODONE BITARTRATE AND ACETAMINOPHEN 5; 325 MG/1; MG/1
2 TABLET ORAL ONCE
Status: COMPLETED | OUTPATIENT
Start: 2017-06-16 | End: 2017-06-16

## 2017-06-16 RX ORDER — IBUPROFEN 800 MG/1
800 TABLET, FILM COATED ORAL EVERY 8 HOURS PRN
Qty: 60 TABLET | Refills: 0 | Status: SHIPPED | OUTPATIENT
Start: 2017-06-16 | End: 2017-06-24

## 2017-06-16 RX ADMIN — HYDROCODONE BITARTRATE AND ACETAMINOPHEN 2 TABLET: 5; 325 TABLET ORAL at 23:41

## 2017-06-16 NOTE — ED AVS SNAPSHOT
[facility]    [address]    [facility] [address]    Phone:  [phone]    Fax:  [phone]                                       Eladio Navarro   MRN: [id number]    Department:  [facility]   Date of Visit:  6/16/2017           After Visit Summary Signature Page     I have received my discharge instructions, and my questions have been answered. I have discussed any challenges I see with this plan with the nurse or doctor.    ..........................................................................................................................................  Patient/Patient Representative Signature      ..........................................................................................................................................  Patient Representative Print Name and Relationship to Patient    ..................................................               ................................................  Date                                            Time    ..........................................................................................................................................  Reviewed by Signature/Title    ...................................................              ..............................................  Date                                                            Time

## 2017-06-16 NOTE — ED AVS SNAPSHOT
[facility] Emergency Department    [address] [place] [address]    Phone:  [phone]    Fax:  [phone]                                       Eladio Navarro   MRN: [id number]    Department:  [facility] Emergency Department   Date of Visit:  6/16/2017           Patient Information     Date Of Birth          1985        Your diagnoses for this visit were:     Right shoulder pain, unspecified chronicity recurrent       You were seen by Oniel Reyes MD.      Follow-up Information     Follow up with Primary doctor Tuesday.        Discharge Instructions       Discharge Instructions  Chronic Pain  You were seen today for an issue regarding chronic or recurrent pain. You may have a condition that gives you pain every day, or a condition that causes pain that keeps coming back, or several conditions involving pain.   Many patients with chronic or recurrent pain come to the Emergency Department thinking that a shot of narcotic pain medicine or a prescription for pain pills to take home is the best answer to their problem. We have discovered, though, that these approaches put our patients at high risk of long-term problems. This sort of treatment may actually make your pain worse, make it harder to control, and put you at an unacceptable risk of complications.   Because of the risks, we are very hesitant to treat your type of pain with short-acting or potentially habit-forming medications. These medications represent a significant risk to your health, and need to be managed by a physician who can follow your care consistently.  We have established a policy for the treatment of patients with chronic or recurrent pain that does not allow for treatment with injection narcotics or take-home prescriptions for narcotics.  We will treat your symptoms with non-narcotic medications and other treatments, and we will make referrals to pain specialists or other  specialized providers if we think such referrals would benefit you.  You should expect to receive treatment consistent with this policy during future visits.    If you have concerns about our policy, please discuss them with your primary care provider or pain specialist, who can contact us if necessary for further information or clarification.  If you were given a prescription for medicine here today, be sure to read all of the information (including the package insert) that comes with your prescription.  This will include important information about the medicine, its side effects, and any warnings that you need to know about.  The pharmacist who fills the prescription can provide more information and answer questions you may have about the medicine.  If you have questions or concerns that the pharmacist cannot address, please call or return to the Emergency Department.   Remember that you can always come back to the Emergency Department if you develop any new symptoms or if there is anything that worries you.        24 Hour Appointment Hotline       To make an appointment at any [facility], call [phone] ([phone]). If you don't have a family doctor or clinic, we will help you find one. [facility] are conveniently located to serve the needs of you and your family.             Review of your medicines      START taking        Dose / Directions Last dose taken    * ibuprofen 800 MG tablet   Commonly known as:  ADVIL/MOTRIN   Dose:  800 mg   Quantity:  60 tablet        Take 1 tablet (800 mg) by mouth every 8 hours as needed for moderate pain   Refills:  0        * Notice:  This list has 1 medication(s) that are the same as other medications prescribed for you. Read the directions carefully, and ask your doctor or other care provider to review them with you.      Our records show that you are taking the medicines listed below. If these are incorrect, please call your family doctor or clinic.         Dose / Directions Last dose taken    gabapentin 300 MG capsule   Commonly known as:  NEURONTIN   Dose:  300 mg   Quantity:  21 capsule        Take 1 capsule (300 mg) by mouth 3 times daily   Refills:  0        hydrOXYzine 25 MG capsule   Commonly known as:  VISTARIL   Dose:  25 mg   Quantity:  120 capsule        Take 1 capsule (25 mg) by mouth 3 times daily as needed for other (pain flare)   Refills:  0        lidocaine 5 % Patch   Commonly known as:  LIDODERM   Dose:  1 patch   Quantity:  10 patch        Place 1 patch onto the skin every 24 hours for 10 days   Refills:  0        methocarbamol 500 MG tablet   Commonly known as:  ROBAXIN   Dose:  1000 mg   Quantity:  30 tablet        Take 2 tablets (1,000 mg) by mouth 3 times daily as needed for muscle spasms   Refills:  1        methylPREDNISolone 4 MG tablet   Commonly known as:  MEDROL DOSEPAK   Quantity:  21 tablet        Follow package instructions   Refills:  0          ASK your doctor about these medications        Dose / Directions Last dose taken    * ibuprofen 800 MG tablet   Commonly known as:  ADVIL/MOTRIN   Dose:  800 mg   Quantity:  60 tablet   Ask about: Should I take this medication?        Take 1 tablet (800 mg) by mouth every 8 hours as needed for moderate pain   Refills:  0        * Notice:  This list has 1 medication(s) that are the same as other medications prescribed for you. Read the directions carefully, and ask your doctor or other care provider to review them with you.            Prescriptions were sent or printed at these locations (1 Prescription)                   Other Prescriptions                Printed at Department/Unit printer (1 of 1)         ibuprofen (ADVIL/MOTRIN) 800 MG tablet                Orders Needing Specimen Collection     None      Pending Results     No orders found from 6/14/2017 to 6/17/2017.            Pending Culture Results     No orders found from 6/14/2017 to 6/17/2017.            Pending Results Instructions      If you had any lab results that were not finalized at the time of your Discharge, you can call the ED Lab Result RN at [phone]. You will be contacted by this team for any positive Lab results or changes in treatment. The nurses are available 7 days a week from 10A to 6:30P.  You can leave a message 24 hours per day and they will return your call.        Test Results From Your Hospital Stay               Clinical Quality Measure: Blood Pressure Screening     Your blood pressure was checked while you were in the emergency department today. The last reading we obtained was  BP: 132/71 . Please read the guidelines below about what these numbers mean and what you should do about them.  If your systolic blood pressure (the top number) is less than 120 and your diastolic blood pressure (the bottom number) is less than 80, then your blood pressure is normal. There is nothing more that you need to do about it.  If your systolic blood pressure (the top number) is 120-139 or your diastolic blood pressure (the bottom number) is 80-89, your blood pressure may be higher than it should be. You should have your blood pressure rechecked within a year by a primary care provider.  If your systolic blood pressure (the top number) is 140 or greater or your diastolic blood pressure (the bottom number) is 90 or greater, you may have high blood pressure. High blood pressure is treatable, but if left untreated over time it can put you at risk for heart attack, stroke, or kidney failure. You should have your blood pressure rechecked by a primary care provider within the next 4 weeks.  If your provider in the emergency department today gave you specific instructions to follow-up with your doctor or provider even sooner than that, you should follow that instruction and not wait for up to 4 weeks for your follow-up visit.        Thank you for choosing [facility]       Thank you for choosing [facility] for your care. Our goal is always to  "provide you with excellent care. Hearing back from our patients is one way we can continue to improve our services. Please take a few minutes to complete the written survey that you may receive in the mail after you visit with us. Thank you!        [employer] Information     [employer] lets you send messages to your doctor, view your test results, renew your prescriptions, schedule appointments and more. To sign up, go to [email or web address] . Click on \"Log in\" on the left side of the screen, which will take you to the Welcome page. Then click on \"Sign up Now\" on the right side of the page.     You will be asked to enter the access code listed below, as well as some personal information. Please follow the directions to create your username and password.     Your access code is: [email or web address]  Expires: 2017  6:17 PM     Your access code will  in 90 days. If you need help or a new code, please call your [facility] [facility] clinic or [phone].        Care EveryWhere ID     This is your Care EveryWhere ID. This could be used by other organizations to access your [facility] medical records  [id number]        After Visit Summary       This is your record. Keep this with you and show to your community pharmacist(s) and doctor(s) at your next visit.                  "

## 2017-06-17 NOTE — ED NOTES
Pt reports he was seen here a few days ago for same symptoms and is not getting any better. Pt having pain in right side of neck and right shoulder after an injury while moving a table saw.

## 2017-06-17 NOTE — ED PROVIDER NOTES
History     Chief Complaint:  Shoulder Pain      HPI   Eladio Navarro is a 31 year old male with history of chronic pain and numerous recent prescriptions for controlled substances and presents for evaluation of right shoulder pain.  Since early June he has been having pain to the site, whether rotator cuff related or related to possible cervical radiculopathy, and is unclear.  He is recently been prescribed Robaxin, Neurontin, and Vistaril.  Today he says two days ago he helped his sister move a queen that exacerbated his symptoms.  He has follow-up on Tuesday although was previously noted to have had follow-up last week.  He denies any new trauma since then, or new symptoms, and endorses continued stable pain to the right shoulder.    Allergies:  No Known Allergies     Medications:    ibuprofen (ADVIL/MOTRIN) 800 MG tablet   methocarbamol (ROBAXIN) 500 MG tablet   gabapentin (NEURONTIN) 300 MG capsule   hydrOXYzine (VISTARIL) 25 MG capsule   lidocaine (LIDODERM) 5 % Patch   methylPREDNISolone (MEDROL DOSEPAK) 4 MG tablet     Past Medical History:    Past Medical History:   Diagnosis Date     Chronic shoulder pain      Degenerative disc disease, cervical        Past Surgical History:    Past Surgical History:   Procedure Laterality Date     ORTHOPEDIC SURGERY         Family History:    Family History   Problem Relation Age of Onset     DIABETES Maternal Grandmother        Social History:  Single  smoker     Review of Systems  MSK: + as per above  All other systems reviewed and negative    Physical Exam   First Vitals:  BP: 132/71  Pulse: 88  Temp: 97.9  F (36.6  C)  Resp: 18  SpO2: 99 %    Physical Exam  Constitutional:  Alert, attentive  HENT:    Nose: Nose normal.    Mouth/Throat: Oropharynx is clear, mucous membranes are moist  Eyes:    EOM are normal. Pupils are equal, round, and reactive to light.   CV:    regular rate and rhythm; no murmurs, rubs or gallups. 2+ radial and ulnar pulses to the bilateral  upper extremities   Chest:   Effort normal and breath sounds normal.   GI:     There is no tenderness. No distension. Normal bowel sounds  Neurological:  5/5 strength to the radian, ulnar and median motor distributions;      sensation intact to light touch to the radian, ulnar and median distributions  MSK:   Full range of motion of the right shoulder.  There is no apparent contusion to the right proximal inner biceps.  No effusion or erythema noted nor asymmetric warmth to the shoulder.  Vague nonspecific bony tenderness noted.  Skin:    Skin is warm and dry.       Emergency Department Course         Interventions:  Norco 2 tabs    Emergency Department Course:  I saw and examined the patient shortly after arrival to the ED bed.  I explained my medical impression and plan for care, and the patient consented to this plan.      I reiterated the EPPA and  chronic pain policy, including its underpinnings and goals for optimal management of chronic pain, and that I would be unable to provide IV or prescription narcotics in this visit.  The patient demonstrated understanding of this policy and my explanation, and I provided chronic pain resources.    I emphasized the importance of PCP follow-up and the strict return precautions provided.  The patient demonstrated understanding of and comfortability with this plan.      Impression & Plan         Medical Decision Making:  This 31-year-old male with history of chronic neck and left shoulder pain, now with numerous visits in 2017 for various additional musculoskeletal complaints, who presents for evaluation of right shoulder pain.  He has now had a total four ED visits in the last two weeks for this concern.  There is not appear to be indication for advanced imaging at this time, and he has previously declined x-ray imaging.  I discussed that a second round of steroids is unlikely to be helpful and in fact could place him at risk for GI bleed and/or adrenal suppression.  He  has trialed and had minimal to no relief of symptoms with various neuropathic pain medications and other nonnarcotic medications.  He was given Norco two tabs here.  I recommended primary care follow-up as reportedly scheduled to discuss further pain management and possible MRI.  He said for sure this time with strict return precautions for worse pain, weakness, numbness, or any other concerns    Diagnosis:    ICD-10-CM    1. Right shoulder pain, unspecified chronicity M25.511     recurrent     Disposition:  Home in improved condition      Oniel Reyes MD  [facility]  [facility] Emergency Department        Oniel Reyes  6/16/2017   [facility] EMERGENCY DEPARTMENT       Oniel Reyes MD  06/17/17 0129

## 2017-09-15 ENCOUNTER — APPOINTMENT (OUTPATIENT)
Dept: GENERAL RADIOLOGY | Facility: CLINIC | Age: 32
End: 2017-09-15
Attending: NURSE PRACTITIONER
Payer: COMMERCIAL

## 2017-09-15 ENCOUNTER — APPOINTMENT (OUTPATIENT)
Dept: CT IMAGING | Facility: CLINIC | Age: 32
End: 2017-09-15
Attending: NURSE PRACTITIONER
Payer: COMMERCIAL

## 2017-09-15 ENCOUNTER — HOSPITAL ENCOUNTER (EMERGENCY)
Facility: CLINIC | Age: 32
Discharge: HOME | End: 2017-09-15
Attending: NURSE PRACTITIONER | Admitting: NURSE PRACTITIONER
Payer: COMMERCIAL

## 2017-09-15 VITALS
RESPIRATION RATE: 18 BRPM | SYSTOLIC BLOOD PRESSURE: 134 MMHG | DIASTOLIC BLOOD PRESSURE: 61 MMHG | TEMPERATURE: 98.6 F | HEART RATE: 90 BPM | OXYGEN SATURATION: 99 % | HEIGHT: 70 IN

## 2017-09-15 DIAGNOSIS — M79.18 MUSCULOSKELETAL PAIN: ICD-10-CM
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
## 2017-09-15 PROCEDURE — 25000132 ZZH RX MED GY IP 250 OP 250 PS 637: Performed by: NURSE PRACTITIONER

## 2017-09-15 PROCEDURE — 99283 EMERGENCY DEPT VISIT LOW MDM: CPT | Mod: 25

## 2017-09-15 PROCEDURE — 73502 X-RAY EXAM HIP UNI 2-3 VIEWS: CPT

## 2017-09-15 PROCEDURE — 73610 X-RAY EXAM OF ANKLE: CPT | Mod: RT

## 2017-09-15 PROCEDURE — 70450 CT HEAD/BRAIN W/O DYE: CPT

## 2017-09-15 PROCEDURE — 72125 CT NECK SPINE W/O DYE: CPT

## 2017-09-15 PROCEDURE — 72072 X-RAY EXAM THORAC SPINE 3VWS: CPT

## 2017-09-15 RX ORDER — OXYCODONE AND ACETAMINOPHEN 5; 325 MG/1; MG/1
1-2 TABLET ORAL EVERY 6 HOURS PRN
Qty: 20 TABLET | Refills: 0 | Status: SHIPPED | OUTPATIENT
Start: 2017-09-15

## 2017-09-15 RX ORDER — OXYCODONE AND ACETAMINOPHEN 5; 325 MG/1; MG/1
2 TABLET ORAL ONCE
Status: COMPLETED | OUTPATIENT
Start: 2017-09-15 | End: 2017-09-15

## 2017-09-15 RX ORDER — IBUPROFEN 600 MG/1
600 TABLET, FILM COATED ORAL EVERY 6 HOURS PRN
Qty: 30 TABLET | Refills: 1 | Status: SHIPPED | OUTPATIENT
Start: 2017-09-15

## 2017-09-15 RX ADMIN — OXYCODONE HYDROCHLORIDE AND ACETAMINOPHEN 2 TABLET: 5; 325 TABLET ORAL at 21:21

## 2017-09-15 ASSESSMENT — ENCOUNTER SYMPTOMS
HEADACHES: 1
NECK PAIN: 1

## 2017-09-15 NOTE — ED AVS SNAPSHOT
[facility] Emergency Department    [address]    [facility] [address]    Phone:  [phone]    Fax:  [phone]                                       Eladio Navarro   MRN: [id number]    Department:  [facility] Emergency Department   Date of Visit:  9/15/2017           Patient Information     Date Of Birth          1985        Your diagnoses for this visit were:     Fall, initial encounter     Musculoskeletal pain        You were seen by Mahi Toney, LISSETT CNP.      Follow-up Information     Follow up with No Ref-Primary, Physician In 3 days.        Discharge Instructions         General Neck and Back Pain    Both neck and back pain are usually caused by injury to the muscles or ligaments of the spine. Sometimes the disks that separate each bone of the spine may cause pain by pressing on a nearby nerve. Back and neck pain may appear after a sudden twisting or bending force (such as in a car accident), or sometimes after a simple awkward movement. In either case, muscle spasm is often present and adds to the pain.  Acute neck and back pain usually gets better in 1 to 2 weeks. Pain related to disk disease, arthritis in the spinal joints or spinal stenosis (narrowing of the spinal canal) can become chronic and last for months or years.  Back and neck pain are common problems. Most people feel better in 1 or 2 weeks, and most of the rest in 1 to 2 months. Most people can remain active.  People experience and describe pain differently.    Pain can be sharp, stabbing, shooting, aching, cramping, or burning    Movement, standing, bending, lifting, sitting, or walking may worsen the pain    Pain can be localized to one spot or area, or it can be more generalized    Pain can spread or radiate upwards, downwards, to the front, or go down your arms    Muscle spasm may occur.  Most of the time mechanical problems with the muscles or spine cause the pain. it is usually  caused by an injury, whether known or not, to the muscles or ligaments. While illnesses can cause back pain, it is usually not caused by a serious illness. Pain is usually related to physical activity, whether sports, exercise, work, or normal activity. Sometimes it can occur without an identifiable cause. This can happen simply by stretching or moving wrong, without noting pain at the time. Other causes include:    Overexertion, lifting, pushing, pulling incorrectly or too aggressively.    Sudden twisting, bending or stretching from an accident (car or fall), or accidental movement.    Poor posture    Poor conditioning, lack of regular exercise    Spinal disc disease or arthritis    Stress    Pregnancy, or illness like appendicitis, bladder or kidney infection, pelvic infections   Home care    For neck pain: Use a comfortable pillow that supports the head and keeps the spine in a neutral position. The position of the head should not be tilted forward or backward.    When in bed, try to find a position of comfort. A firm mattress is best. Try lying flat on your back with pillows under your knees. You can also try lying on your side with your knees bent up towards your chest and a pillow between your knees.    At first, do not try to stretch out the sore spots. If there is a strain, it is not like the good soreness you get after exercising without an injury. In this case, stretching may make it worse.    Avoid prolonged sitting, long car rides or travel. This puts more stress on the lower back than standing or walking.    During the first 24 to 72 hours after an injury, apply an ice pack to the painful area for 20 minutes and then remove it for 20 minutes over a period of 60 to 90 minutes or several times a day.     You can alternate ice and heat therapies. Talk with your healthcare provider about the best treatment for your back or neck pain. As a safety precaution, do not use a heating pad at bedtime. Sleeping with  a heating pad can lead to skin burns or tissue damage.    Therapeutic massage can help relax the back and neck muscles without stretching them.    Be aware of safe lifting methods and do not lift anything over 15 pounds until all the pain is gone.  Medications  Talk to your healthcare provider before using medicine, especially if you have other medical problems or are taking other medicines.    You may use over-the-counter medicine to control pain, unless another pain medicine was prescribed. If you have chronic conditions like diabetes, liver or kidney disease, stomach ulcers,  gastrointestinal bleeding, or are taking blood thinner medicines.    Be careful if you are given pain medicines, narcotics, or medicine for muscle spasm. They can cause drowsiness, and can affect your coordination, reflexes, and judgment. Do not drive or operate heavy machinery.  Follow-up care  Follow up with your healthcare provider, or as advised. Physical therapy or further tests may be needed.  If X-rays were taken, you will be notified of any new findings that may affect your care.  Call 911  Seek emergency medical care if any of the following occur:    Trouble breathing    Confusion    Very drowsy or trouble awakening    Fainting or loss of consciousness    Rapid or very slow heart rate    Loss of bowel or bladder control  When to seek medical advice  Call your healthcare provider right away if any of these occur:    Pain becomes worse or spreads into your arms or legs    Weakness, numbness or pain in one or both arms or legs    Numbness in the groin area    Difficulty walking    Fever of 100.4 F (38 C) or higher, or as directed by your healthcare provider  Date Last Reviewed: 7/1/2016 2000-2017 [employer]. [address] [place] [address], [place] [address]. All rights reserved. This information is not intended as a substitute for professional medical care. Always follow your healthcare professional's  instructions.          24 Hour Appointment Hotline       To make an appointment at any [facility], call [phone] ([phone]). If you don't have a family doctor or clinic, we will help you find one. [place] clinics are conveniently located to serve the needs of you and your family.             Review of your medicines      START taking        Dose / Directions Last dose taken    ibuprofen 600 MG tablet   Commonly known as:  ADVIL/MOTRIN   Dose:  600 mg   Quantity:  30 tablet        Take 1 tablet (600 mg) by mouth every 6 hours as needed for moderate pain   Refills:  1        oxyCODONE-acetaminophen 5-325 MG per tablet   Commonly known as:  PERCOCET   Dose:  1-2 tablet   Quantity:  20 tablet        Take 1-2 tablets by mouth every 6 hours as needed   Refills:  0          Our records show that you are taking the medicines listed below. If these are incorrect, please call your family doctor or clinic.        Dose / Directions Last dose taken    gabapentin 300 MG capsule   Commonly known as:  NEURONTIN   Dose:  300 mg   Quantity:  21 capsule        Take 1 capsule (300 mg) by mouth 3 times daily   Refills:  0        hydrOXYzine 25 MG capsule   Commonly known as:  VISTARIL   Dose:  25 mg   Quantity:  120 capsule        Take 1 capsule (25 mg) by mouth 3 times daily as needed for other (pain flare)   Refills:  0        methocarbamol 500 MG tablet   Commonly known as:  ROBAXIN   Dose:  1000 mg   Quantity:  30 tablet        Take 2 tablets (1,000 mg) by mouth 3 times daily as needed for muscle spasms   Refills:  1        methylPREDNISolone 4 MG tablet   Commonly known as:  MEDROL DOSEPAK   Quantity:  21 tablet        Follow package instructions   Refills:  0                Prescriptions were sent or printed at these locations (2 Prescriptions)                   Other Prescriptions                Printed at Department/Unit printer (2 of 2)         oxyCODONE-acetaminophen (PERCOCET) 5-325 MG per tablet                ibuprofen (ADVIL/MOTRIN) 600 MG tablet                Procedures and tests performed during your visit     Ankle XR, G/E 3 views, right    Cervical spine CT w/o contrast    Head CT w/o contrast    Thoracic spine XR, 3 views    XR Pelvis w Hip Right G/E 2 Views      Orders Needing Specimen Collection     None      Pending Results     No orders found from 9/13/2017 to 9/16/2017.            Pending Culture Results     No orders found from 9/13/2017 to 9/16/2017.            Pending Results Instructions     If you had any lab results that were not finalized at the time of your Discharge, you can call the ED Lab Result RN at [phone]. You will be contacted by this team for any positive Lab results or changes in treatment. The nurses are available 7 days a week from 10A to 6:30P.  You can leave a message 24 hours per day and they will return your call.        Test Results From Your Hospital Stay        9/15/2017 10:22 PM      Narrative     CT OF THE HEAD WITHOUT CONTRAST 9/15/2017 10:09 PM     COMPARISON: Head CT 2/14/2017    HISTORY: Fall    TECHNIQUE: Axial CT images of the head from the skull base to the  vertex were acquired without IV contrast.    FINDINGS: The ventricles and basal cisterns are within normal limits  in configuration. There is no midline shift. There are no extra-axial  fluid collections. Gray-white differentiation is well maintained.    No intracranial hemorrhage, mass or recent infarct.    The visualized paranasal sinuses are well-aerated. There is no  mastoiditis. There are no fractures of the visualized bones.        Impression     IMPRESSION: Normal head CT.      Radiation dose for this scan was reduced using automated exposure  control, adjustment of the mA and/or kV according to patient size, or  iterative reconstruction technique    LUC FRANCISCO MD         9/15/2017 10:22 PM      Narrative     CT OF THE CERVICAL SPINE WITHOUT CONTRAST   9/15/2017 10:10 PM     COMPARISON: CT cervical  spine 2/14/2017    HISTORY: Fall     TECHNIQUE: Axial images of the cervical spine were acquired without  intravenous contrast. Multiplanar reformations were created.      FINDINGS: There is normal alignment of the cervical vertebrae.  Vertebral body heights of the cervical spine are normal.  Craniocervical alignment is normal. There are no fractures of the  cervical spine. Degenerative endplate spurring at the C5-C6 level  again noted. Posterior disc bulges at C4-C5 and C5-C6 again noted.  Mild degenerative spinal canal narrowing at the C4-C5 and C5-C6 levels  again noted. There is no prevertebral soft tissue swelling.        Impression     IMPRESSION: Degenerative changes of the cervical spine again noted. No  evidence for fracture or traumatic malalignment.      Radiation dose for this scan was reduced using automated exposure  control, adjustment of the mA and/or kV according to patient size, or  iterative reconstruction technique    LUC FRANCISCO MD         9/15/2017 10:59 PM      Narrative     THORACIC SPINE THREE VIEWS  9/15/2017 10:33 PM     COMPARISON: Two-view thoracic spine 2/14/2017.    HISTORY: Fall.        Impression     IMPRESSION: There is continued normal alignment of the thoracic  vertebrae. Vertebral body heights of the thoracic spine are normal.  Thoracic intervertebral disc space heights are normal. There is no  evidence for fracture of the thoracic spine.    LUC FRANCISCO MD         9/15/2017 10:59 PM      Narrative     PELVIS AND HIP RIGHT TWO VIEWS 9/15/2017 10:32 PM     COMPARISON: Two view left hip 2/14/2017    HISTORY: Fall    FINDINGS: The visualized bones and joint spaces are within normal  limits.        Impression     IMPRESSION: No evidence for fracture, dislocation or significant  degenerative change of the pelvis or right hip.    LUC FRANCISCO MD         9/15/2017 10:58 PM      Narrative     ANKLE RIGHT THREE OR MORE VIEWS  9/15/2017 10:32 PM     COMPARISON: None    HISTORY:  Fall    FINDINGS: The visualized bones and joint spaces are within normal  limits.        Impression     IMPRESSION: No evidence for fracture, dislocation or significant  degenerative change of the right ankle.    LUC FRANCISCO MD                Clinical Quality Measure: Blood Pressure Screening     Your blood pressure was checked while you were in the emergency department today. The last reading we obtained was  BP: 107/69 . Please read the guidelines below about what these numbers mean and what you should do about them.  If your systolic blood pressure (the top number) is less than 120 and your diastolic blood pressure (the bottom number) is less than 80, then your blood pressure is normal. There is nothing more that you need to do about it.  If your systolic blood pressure (the top number) is 120-139 or your diastolic blood pressure (the bottom number) is 80-89, your blood pressure may be higher than it should be. You should have your blood pressure rechecked within a year by a primary care provider.  If your systolic blood pressure (the top number) is 140 or greater or your diastolic blood pressure (the bottom number) is 90 or greater, you may have high blood pressure. High blood pressure is treatable, but if left untreated over time it can put you at risk for heart attack, stroke, or kidney failure. You should have your blood pressure rechecked by a primary care provider within the next 4 weeks.  If your provider in the emergency department today gave you specific instructions to follow-up with your doctor or provider even sooner than that, you should follow that instruction and not wait for up to 4 weeks for your follow-up visit.        Thank you for choosing [facility]       Thank you for choosing [facility] for your care. Our goal is always to provide you with excellent care. Hearing back from our patients is one way we can continue to improve our services. Please take a few minutes to complete the written  "survey that you may receive in the mail after you visit with us. Thank you!        [employer] Information     [employer] lets you send messages to your doctor, view your test results, renew your prescriptions, schedule appointments and more. To sign up, go to [email or web address] . Click on \"Log in\" on the left side of the screen, which will take you to the Welcome page. Then click on \"Sign up Now\" on the right side of the page.     You will be asked to enter the access code listed below, as well as some personal information. Please follow the directions to create your username and password.     Your access code is: [email or web address]  Expires: 2017 11:01 PM     Your access code will  in 90 days. If you need help or a new code, please call your [facility] clinic or [phone].        Care EveryWhere ID     This is your Care EveryWhere ID. This could be used by other organizations to access your [facility] medical records  [id number]        Equal Access to Services     MITA HERNANDEZ : Hadrasheeda hastings [place], waaxda lucarlineadaha, qaybta kaaljeanie virgen, ann gifford . So [facility] [phone].    ATENCIÓN: Si habla español, tiene a griffin disposición servicios gratuitos de asistencia lingüística. Llame al [phone].    We comply with applicable federal civil rights laws and [place] laws. We do not discriminate on the basis of race, color, national origin, age, disability sex, sexual orientation or gender identity.            After Visit Summary       This is your record. Keep this with you and show to your community pharmacist(s) and doctor(s) at your next visit.                  "

## 2017-09-15 NOTE — ED AVS SNAPSHOT
[facility] Emergency Department    [address]    [facility] [address]    Phone:  [phone]    Fax:  [phone]                                       Eladio Navarro   MRN: [id number]    Department:  [facility] Emergency Department   Date of Visit:  9/15/2017           After Visit Summary Signature Page     I have received my discharge instructions, and my questions have been answered. I have discussed any challenges I see with this plan with the nurse or doctor.    ..........................................................................................................................................  Patient/Patient Representative Signature      ..........................................................................................................................................  Patient Representative Print Name and Relationship to Patient    ..................................................               ................................................  Date                                            Time    ..........................................................................................................................................  Reviewed by Signature/Title    ...................................................              ..............................................  Date                                                            Time

## 2017-09-16 NOTE — ED NOTES
Tonight was roller blading and fell and rolled down a hill worst pain is mid neck .coller applied at triage  Has bilateral shoulder pain right ankly pain right hand pa9on lo knee paion was not wearing a helmet. Says accident happened yesterday at 6 PM

## 2017-09-16 NOTE — DISCHARGE INSTRUCTIONS

## 2017-09-16 NOTE — ED PROVIDER NOTES
"  History     Chief Complaint:    Fall     HPI   Eladio Navarro is a 32 year old male who presents to the ED today after a fall. The patient states that about an hour ago, he was roller skating down a hill when he lost balance and rolled quite a distance down the rest of the hill. He was not wearing a helmet but reports that he does not believe that he lost consciousness during or after the fall. The patient reports to a headache, neck and shoulder pain, and pain to his right hip, knee, and ankle.     Allergies:  No known drug allergies.     Medications:    Robaxin  Neurontin  Vistaril   Medrol     Past Medical History:    Chronic shoulder pain  Degenerative disc disease, cervical     Past Surgical History:    Orthopedic surgery    Family History:    History reviewed. No pertinent family history.     Social History:  Marital Status: single  Presents to the ED alone  Tobacco Use: current some day smoker  Alcohol Use: no     Review of Systems   Musculoskeletal: Positive for neck pain.        Positive for shoulder pain.  Positive for right hip pain.  Positive for right knee pain.  Positive for right ankle pain.    Neurological: Positive for headaches.   All other systems reviewed and are negative.      Physical Exam   First Vitals:  BP: 135/80  Pulse: 90  Temp: 98.6  F (37  C)  Resp: 18  Height: 177.8 cm (5' 10\")  SpO2: 96 %    Physical Exam   General: Appears stated age  HENT: Atraumatic.   Eyes: No scleral injection, conjunctivitis, or drainage.    Neck: Supple with normal ROM.  No c-spine midline tenderness.  Cardio: Regular rate and rhythm, no murmurs, rubs, or gallops  Pulmonary/Chest: Clear to ausculation bilaterally.    Abdomen: Soft, non-tender, normal bowel sounds, no rebound or guarding  Musculoskeletal: No pedal edema, thoracic tenderness to palpation right hip and pain with range of motion, thoracic tenderness to palpation right ankle and pain with range of motion  Lymph: No anterior or posterior " lymphadenopathy.   Neuro: Alert and oriented, no focal deficits noted.   Skin: Normal color and temperature, no rashes to visible exposed skin.   Psych: Mood and affect normal.       Emergency Department Course   Imaging:  Thoracic spine xray, 3 views  There is continued normal alignment of the thoracic  vertebrae. Vertebral body heights of the thoracic spine are normal.  Thoracic intervertebral disc space heights are normal. There is no  evidence for fracture of the thoracic spine.  Report per radiology.     Xray pelvis w right hip, 2 views  No evidence for fracture, dislocation or significant  degenerative change of the pelvis or right hip.  Report per radiology.     Ankle xray. 3 views   No evidence for fracture, dislocation or significant  degenerative change of the right ankle.  Report per radiology.     Cervical spine CT w/o contrast  Degenerative changes of the cervical spine again noted. No  evidence for fracture or traumatic malalignment.  Report per radiology.     Head CT w/o contrast  Normal head CT.  Report per radiology.   Radiographic findings were communicated with the patient who voiced understanding of the findings.    Interventions:  (2121) percocet 2 tablets, PO    Emergency Department Course:  Nursing notes and vitals reviewed.  (2102) I performed an exam of the patient as documented above.    The patient was sent for a thoracic spine, ankle, hip and pelvic xray along with a cervical spine and head CT while in the emergency department, findings above.    Findings and plan explained to the patient. Patient discharged home with instructions regarding supportive care, medications, and reasons to return. The importance of close follow-up was reviewed. The patient was prescribed ibuprofen and percocet.        Impression & Plan    Medical Decision Making:  Eladio Navarro is a 32 year old male who presents for evaluation of a fall.  This was clearly a mechanical fall, not syncope.  There were no  prodromal symptoms so I doubt stroke, cardiac arrhythmia or other serious etiology.  Detailed exam shows musculoskeletal pain.  Based on this I did not do basic blood work and urinalysis.  Results as above.  I had the tech ambulate the patient and he did well.  Based on this I would send home for outpatient management.  I would not do workup for syncope, stroke, ACS, PE at this point.  I doubt serious underlying fractures, intracerebral issues, spinal fractures.       Diagnosis:    ICD-10-CM    1. Fall, initial encounter W19.XXXA    2. Musculoskeletal pain M79.1        Disposition:  discharged to home    Discharge Medications:  New Prescriptions    IBUPROFEN (ADVIL/MOTRIN) 600 MG TABLET    Take 1 tablet (600 mg) by mouth every 6 hours as needed for moderate pain    OXYCODONE-ACETAMINOPHEN (PERCOCET) 5-325 MG PER TABLET    Take 1-2 tablets by mouth every 6 hours as needed       I, Claudia Alford , am serving as a scribe on 9/15/2017 at 9:02 PM to personally document services performed by Mahi Toney CNP based on my observations and the provider's statements to me.   9/15/2017   [facility] EMERGENCY DEPARTMENT       Mahi Toney APRN CNP  09/16/17 [phone]

## 2018-09-21 ENCOUNTER — HOSPITAL ENCOUNTER (EMERGENCY)
Facility: CLINIC | Age: 33
Discharge: LEFT BEFORE DISCHARGE | End: 2018-09-21
Payer: COMMERCIAL

## 2018-09-21 VITALS
RESPIRATION RATE: 16 BRPM | OXYGEN SATURATION: 97 % | SYSTOLIC BLOOD PRESSURE: 137 MMHG | DIASTOLIC BLOOD PRESSURE: 84 MMHG | TEMPERATURE: 98 F

## 2018-09-22 NOTE — ED TRIAGE NOTES
PT reports e was moving large storage drums off shelves in a garage, one of the drums fell and struck pt on left shoulder neck area.  Pt now with pain in the left shoulder and neck.  CMS intact.

## 2019-03-04 PROBLEM — F11.10 NARCOTIC ABUSE (H): Status: ACTIVE | Noted: 2019-03-04

## 2019-03-04 PROBLEM — Z76.5 DRUG-SEEKING BEHAVIOR: Status: ACTIVE | Noted: 2017-07-14

## 2025-07-14 ENCOUNTER — HOSPITAL ENCOUNTER (EMERGENCY)
Age: 40
Discharge: HOME | End: 2025-07-14
Payer: COMMERCIAL

## 2025-07-14 VITALS
RESPIRATION RATE: 20 BRPM | SYSTOLIC BLOOD PRESSURE: 136 MMHG | TEMPERATURE: 98.6 F | DIASTOLIC BLOOD PRESSURE: 92 MMHG | HEART RATE: 70 BPM | OXYGEN SATURATION: 97 %

## 2025-07-14 VITALS — BODY MASS INDEX: 26.2 KG/M2

## 2025-07-14 DIAGNOSIS — M79.604: ICD-10-CM

## 2025-07-14 DIAGNOSIS — S80.11XA: Primary | ICD-10-CM

## 2025-07-14 PROCEDURE — 99284 EMERGENCY DEPT VISIT MOD MDM: CPT

## 2025-07-14 PROCEDURE — 99283 EMERGENCY DEPT VISIT LOW MDM: CPT

## 2025-07-14 PROCEDURE — 93971 EXTREMITY STUDY: CPT

## (undated) RX ORDER — HYDROCODONE BITARTRATE AND ACETAMINOPHEN 5; 325 MG/1; MG/1
TABLET ORAL
Status: DISPENSED
Start: 2017-04-15